# Patient Record
Sex: FEMALE | Race: WHITE | Employment: FULL TIME | URBAN - NONMETROPOLITAN AREA
[De-identification: names, ages, dates, MRNs, and addresses within clinical notes are randomized per-mention and may not be internally consistent; named-entity substitution may affect disease eponyms.]

---

## 2019-09-05 ENCOUNTER — OFFICE VISIT (OUTPATIENT)
Dept: FAMILY MEDICINE CLINIC | Age: 54
End: 2019-09-05
Payer: COMMERCIAL

## 2019-09-05 VITALS
HEART RATE: 70 BPM | DIASTOLIC BLOOD PRESSURE: 74 MMHG | SYSTOLIC BLOOD PRESSURE: 118 MMHG | WEIGHT: 135 LBS | OXYGEN SATURATION: 97 % | BODY MASS INDEX: 24.84 KG/M2 | HEIGHT: 62 IN | TEMPERATURE: 97.6 F

## 2019-09-05 DIAGNOSIS — Z00.00 ROUTINE GENERAL MEDICAL EXAMINATION AT A HEALTH CARE FACILITY: Primary | ICD-10-CM

## 2019-09-05 PROCEDURE — 99396 PREV VISIT EST AGE 40-64: CPT | Performed by: FAMILY MEDICINE

## 2019-09-05 RX ORDER — LEVOTHYROXINE SODIUM 0.1 MG/1
100 TABLET ORAL DAILY
COMMUNITY
Start: 2019-08-20

## 2019-09-05 RX ORDER — ATORVASTATIN CALCIUM 20 MG/1
TABLET, FILM COATED ORAL
COMMUNITY
Start: 2019-08-27 | End: 2021-06-28 | Stop reason: SDUPTHER

## 2019-09-05 RX ORDER — PANTOPRAZOLE SODIUM 40 MG/1
TABLET, DELAYED RELEASE ORAL
COMMUNITY
Start: 2019-08-20 | End: 2020-04-28 | Stop reason: SDUPTHER

## 2019-09-05 ASSESSMENT — PATIENT HEALTH QUESTIONNAIRE - PHQ9
SUM OF ALL RESPONSES TO PHQ9 QUESTIONS 1 & 2: 0
SUM OF ALL RESPONSES TO PHQ QUESTIONS 1-9: 0
1. LITTLE INTEREST OR PLEASURE IN DOING THINGS: 0
2. FEELING DOWN, DEPRESSED OR HOPELESS: 0
SUM OF ALL RESPONSES TO PHQ QUESTIONS 1-9: 0

## 2019-09-05 ASSESSMENT — ENCOUNTER SYMPTOMS
BACK PAIN: 0
ABDOMINAL PAIN: 0
EYE PAIN: 0
SINUS PAIN: 0
COUGH: 0
DIARRHEA: 0
SHORTNESS OF BREATH: 0
CONSTIPATION: 0
SORE THROAT: 0

## 2019-10-15 ENCOUNTER — OFFICE VISIT (OUTPATIENT)
Dept: FAMILY MEDICINE CLINIC | Age: 54
End: 2019-10-15
Payer: COMMERCIAL

## 2019-10-15 VITALS
BODY MASS INDEX: 24.51 KG/M2 | OXYGEN SATURATION: 98 % | DIASTOLIC BLOOD PRESSURE: 72 MMHG | SYSTOLIC BLOOD PRESSURE: 120 MMHG | TEMPERATURE: 98.1 F | WEIGHT: 134 LBS | HEART RATE: 68 BPM

## 2019-10-15 DIAGNOSIS — J06.9 UPPER RESPIRATORY TRACT INFECTION, UNSPECIFIED TYPE: Primary | ICD-10-CM

## 2019-10-15 PROCEDURE — 99214 OFFICE O/P EST MOD 30 MIN: CPT | Performed by: PHYSICIAN ASSISTANT

## 2019-10-15 PROCEDURE — 94640 AIRWAY INHALATION TREATMENT: CPT | Performed by: PHYSICIAN ASSISTANT

## 2019-10-15 RX ORDER — AZITHROMYCIN 250 MG/1
250 TABLET, FILM COATED ORAL SEE ADMIN INSTRUCTIONS
Qty: 6 TABLET | Refills: 0 | Status: SHIPPED | OUTPATIENT
Start: 2019-10-15 | End: 2019-10-20

## 2019-10-15 RX ORDER — IPRATROPIUM BROMIDE AND ALBUTEROL SULFATE 2.5; .5 MG/3ML; MG/3ML
1 SOLUTION RESPIRATORY (INHALATION) ONCE
Status: COMPLETED | OUTPATIENT
Start: 2019-10-15 | End: 2019-10-15

## 2019-10-15 RX ORDER — METHYLPREDNISOLONE 4 MG/1
TABLET ORAL
Qty: 1 KIT | Refills: 0 | Status: SHIPPED | OUTPATIENT
Start: 2019-10-15 | End: 2019-10-21

## 2019-10-15 RX ADMIN — IPRATROPIUM BROMIDE AND ALBUTEROL SULFATE 1 AMPULE: 2.5; .5 SOLUTION RESPIRATORY (INHALATION) at 09:09

## 2019-10-15 ASSESSMENT — ENCOUNTER SYMPTOMS
NAUSEA: 0
BACK PAIN: 0
ABDOMINAL PAIN: 0
SORE THROAT: 0
SHORTNESS OF BREATH: 0
PHOTOPHOBIA: 0
DIARRHEA: 0
VOMITING: 0
COUGH: 1

## 2020-04-06 ENCOUNTER — TELEPHONE (OUTPATIENT)
Dept: PRIMARY CARE CLINIC | Age: 55
End: 2020-04-06

## 2020-04-06 NOTE — TELEPHONE ENCOUNTER
Pt was seen in the ER last Friday. She had extreme dizziness with elevated BP. Pt is having numbness and tingling in all her extremities with still a little bit of dizziness. They put her on Prednisone and Flexeril in the ER. She states she needs an MRI ordered of her neck. Please contact pt with further instructions at work# until 3, then her cell.

## 2020-04-10 ENCOUNTER — OFFICE VISIT (OUTPATIENT)
Dept: PRIMARY CARE CLINIC | Age: 55
End: 2020-04-10
Payer: COMMERCIAL

## 2020-04-10 VITALS
SYSTOLIC BLOOD PRESSURE: 120 MMHG | BODY MASS INDEX: 25.21 KG/M2 | HEART RATE: 106 BPM | RESPIRATION RATE: 15 BRPM | DIASTOLIC BLOOD PRESSURE: 88 MMHG | WEIGHT: 137 LBS | TEMPERATURE: 97.8 F | OXYGEN SATURATION: 99 % | HEIGHT: 62 IN

## 2020-04-10 PROCEDURE — 99214 OFFICE O/P EST MOD 30 MIN: CPT | Performed by: FAMILY MEDICINE

## 2020-04-10 RX ORDER — CYCLOBENZAPRINE HCL 10 MG
TABLET ORAL
COMMUNITY
Start: 2020-04-03 | End: 2020-06-08 | Stop reason: SDUPTHER

## 2020-04-10 RX ORDER — PREDNISONE 20 MG/1
TABLET ORAL
COMMUNITY
Start: 2020-04-03 | End: 2020-04-28

## 2020-04-10 RX ORDER — TRAMADOL HYDROCHLORIDE 50 MG/1
50 TABLET ORAL 2 TIMES DAILY
Qty: 20 TABLET | Refills: 0 | Status: SHIPPED | OUTPATIENT
Start: 2020-04-10 | End: 2020-04-20

## 2020-04-10 ASSESSMENT — ENCOUNTER SYMPTOMS
BLOOD IN STOOL: 0
SHORTNESS OF BREATH: 0
ABDOMINAL PAIN: 0
DIARRHEA: 0
COUGH: 0
NAUSEA: 0
TROUBLE SWALLOWING: 0
VOMITING: 0
EYE DISCHARGE: 0
ALLERGIC/IMMUNOLOGIC NEGATIVE: 1
BACK PAIN: 0
PHOTOPHOBIA: 0
SINUS PAIN: 0
EYE REDNESS: 0
EYE PAIN: 0
CHEST TIGHTNESS: 0
SORE THROAT: 0

## 2020-04-10 ASSESSMENT — PATIENT HEALTH QUESTIONNAIRE - PHQ9
SUM OF ALL RESPONSES TO PHQ9 QUESTIONS 1 & 2: 1
SUM OF ALL RESPONSES TO PHQ QUESTIONS 1-9: 1
1. LITTLE INTEREST OR PLEASURE IN DOING THINGS: 1
SUM OF ALL RESPONSES TO PHQ QUESTIONS 1-9: 1
2. FEELING DOWN, DEPRESSED OR HOPELESS: 0

## 2020-04-20 ENCOUNTER — TELEPHONE (OUTPATIENT)
Dept: FAMILY MEDICINE CLINIC | Age: 55
End: 2020-04-20

## 2020-04-20 NOTE — TELEPHONE ENCOUNTER
Merrick Rocha Coordinator       She is calling to tell you the the MRI was approved for [address] [facility] # [id number]      She provided no phone number

## 2020-04-28 ENCOUNTER — OFFICE VISIT (OUTPATIENT)
Dept: PRIMARY CARE CLINIC | Age: 55
End: 2020-04-28
Payer: COMMERCIAL

## 2020-04-28 ENCOUNTER — TELEPHONE (OUTPATIENT)
Dept: ADMINISTRATIVE | Age: 55
End: 2020-04-28

## 2020-04-28 VITALS
OXYGEN SATURATION: 97 % | HEIGHT: 62 IN | HEART RATE: 58 BPM | TEMPERATURE: 96.4 F | WEIGHT: 131 LBS | RESPIRATION RATE: 16 BRPM | BODY MASS INDEX: 24.11 KG/M2 | SYSTOLIC BLOOD PRESSURE: 120 MMHG | DIASTOLIC BLOOD PRESSURE: 82 MMHG

## 2020-04-28 PROBLEM — M34.1 CREST SYNDROME (HCC): Status: ACTIVE | Noted: 2020-04-28

## 2020-04-28 PROBLEM — Z22.7 LTBI (LATENT TUBERCULOSIS INFECTION): Status: ACTIVE | Noted: 2020-04-28

## 2020-04-28 PROBLEM — E03.8 OTHER SPECIFIED HYPOTHYROIDISM: Status: ACTIVE | Noted: 2020-04-28

## 2020-04-28 PROBLEM — G95.0 SYRINGOMYELIA (HCC): Status: ACTIVE | Noted: 2020-04-28

## 2020-04-28 PROBLEM — K21.9 GASTROESOPHAGEAL REFLUX DISEASE WITHOUT ESOPHAGITIS: Status: ACTIVE | Noted: 2020-04-28

## 2020-04-28 PROCEDURE — 99214 OFFICE O/P EST MOD 30 MIN: CPT | Performed by: FAMILY MEDICINE

## 2020-04-28 RX ORDER — OXYCODONE HYDROCHLORIDE 5 MG/1
5 TABLET ORAL EVERY 4 HOURS PRN
COMMUNITY
End: 2020-06-08 | Stop reason: SDUPTHER

## 2020-04-28 RX ORDER — PANTOPRAZOLE SODIUM 40 MG/1
40 TABLET, DELAYED RELEASE ORAL DAILY
Qty: 30 TABLET | Refills: 5 | Status: SHIPPED
Start: 2020-04-28 | End: 2020-05-18 | Stop reason: SDUPTHER

## 2020-04-28 RX ORDER — IBUPROFEN 200 MG
200 TABLET ORAL EVERY 6 HOURS PRN
COMMUNITY

## 2020-04-28 ASSESSMENT — ENCOUNTER SYMPTOMS
DIARRHEA: 0
BACK PAIN: 1
PHOTOPHOBIA: 0
BLOOD IN STOOL: 0
EYE PAIN: 0
ALLERGIC/IMMUNOLOGIC NEGATIVE: 1
NAUSEA: 0
EYE REDNESS: 0
ABDOMINAL PAIN: 0
VOMITING: 0
CHEST TIGHTNESS: 0
COUGH: 0
SHORTNESS OF BREATH: 0
SINUS PAIN: 0
EYE DISCHARGE: 0
TROUBLE SWALLOWING: 0
SORE THROAT: 0

## 2020-04-28 NOTE — PROGRESS NOTES
partner: Not on file     Emotionally abused: Not on file     Physically abused: Not on file     Forced sexual activity: Not on file   Other Topics Concern    Not on file   Social History Narrative    Not on file       Vitals:    04/28/20 1059   BP: 120/82   Pulse: 58   Resp: 16   Temp: 96.4 °F (35.8 °C)   TempSrc: Temporal   SpO2: 97%   Weight: 131 lb (59.4 kg)   Height: 5' 2\" (1.575 m)       Physical Exam  Vitals signs and nursing note reviewed. Constitutional:       Appearance: She is well-developed. HENT:      Head: Atraumatic. Right Ear: External ear normal.      Left Ear: External ear normal.      Nose: Nose normal.      Mouth/Throat:      Pharynx: No oropharyngeal exudate. Eyes:      Conjunctiva/sclera: Conjunctivae normal.      Pupils: Pupils are equal, round, and reactive to light. Neck:      Musculoskeletal: Normal range of motion and neck supple. Thyroid: No thyromegaly. Trachea: No tracheal deviation. Cardiovascular:      Rate and Rhythm: Normal rate and regular rhythm. Heart sounds: No murmur. No friction rub. No gallop. Pulmonary:      Effort: Pulmonary effort is normal. No respiratory distress. Breath sounds: Normal breath sounds. Abdominal:      General: Bowel sounds are normal.      Palpations: Abdomen is soft. Musculoskeletal: Normal range of motion. General: Tenderness present. Lymphadenopathy:      Cervical: No cervical adenopathy. Skin:     General: Skin is warm and dry. Capillary Refill: Capillary refill takes less than 2 seconds. Findings: No rash. Neurological:      Mental Status: She is alert and oriented to person, place, and time. Sensory: Sensory deficit present. Motor: Weakness present. No abnormal muscle tone.       Coordination: Coordination normal.      Gait: Gait abnormal.      Deep Tendon Reflexes: Reflexes normal.      Comments: Walker   Psychiatric:         Mood and Affect: Mood normal.         Behavior:

## 2020-05-08 LAB
BASOPHILS ABSOLUTE: 0.1 /ΜL
BASOPHILS RELATIVE PERCENT: 0.6 %
BUN BLDV-MCNC: 24 MG/DL
CALCIUM SERPL-MCNC: 8.7 MG/DL
CHLORIDE BLD-SCNC: 107 MMOL/L
CO2: 25 MMOL/L
CREAT SERPL-MCNC: 0.8 MG/DL
EOSINOPHILS ABSOLUTE: 0 /ΜL
EOSINOPHILS RELATIVE PERCENT: 0 %
GFR CALCULATED: 0.6
GLUCOSE BLD-MCNC: 227 MG/DL
HCT VFR BLD CALC: 37.5 % (ref 36–46)
HEMOGLOBIN: 12.9 G/DL (ref 12–16)
LYMPHOCYTES ABSOLUTE: 1.5 /ΜL
LYMPHOCYTES RELATIVE PERCENT: 15 %
MCH RBC QN AUTO: 30.9 PG
MCHC RBC AUTO-ENTMCNC: 34.3 G/DL
MCV RBC AUTO: 90.1 FL
MONOCYTES ABSOLUTE: 0.6 /ΜL
MONOCYTES RELATIVE PERCENT: 5.9 %
NEUTROPHILS ABSOLUTE: 7.7 /ΜL
NEUTROPHILS RELATIVE PERCENT: 78.5 %
PLATELET # BLD: 228 K/ΜL
PMV BLD AUTO: 8 FL
POTASSIUM SERPL-SCNC: 3.6 MMOL/L
RBC # BLD: 4.17 10^6/ΜL
SODIUM BLD-SCNC: 139 MMOL/L
WBC # BLD: 9.9 10^3/ML

## 2020-05-18 ENCOUNTER — OFFICE VISIT (OUTPATIENT)
Dept: PRIMARY CARE CLINIC | Age: 55
End: 2020-05-18
Payer: COMMERCIAL

## 2020-05-18 VITALS
HEART RATE: 60 BPM | BODY MASS INDEX: 23.74 KG/M2 | TEMPERATURE: 96.5 F | SYSTOLIC BLOOD PRESSURE: 110 MMHG | DIASTOLIC BLOOD PRESSURE: 66 MMHG | OXYGEN SATURATION: 98 % | HEIGHT: 62 IN | WEIGHT: 129 LBS

## 2020-05-18 PROBLEM — C31.1: Status: ACTIVE | Noted: 2020-05-18

## 2020-05-18 PROBLEM — E78.5 HYPERLIPIDEMIA: Status: ACTIVE | Noted: 2019-08-13

## 2020-05-18 PROCEDURE — 99214 OFFICE O/P EST MOD 30 MIN: CPT | Performed by: FAMILY MEDICINE

## 2020-05-18 PROCEDURE — 1111F DSCHRG MED/CURRENT MED MERGE: CPT | Performed by: FAMILY MEDICINE

## 2020-05-18 RX ORDER — PANTOPRAZOLE SODIUM 40 MG/1
40 TABLET, DELAYED RELEASE ORAL 2 TIMES DAILY
Qty: 60 TABLET | Refills: 5 | Status: SHIPPED
Start: 2020-05-18 | End: 2021-05-10

## 2020-05-18 RX ORDER — PREDNISONE 20 MG/1
20 TABLET ORAL
COMMUNITY
Start: 2020-05-18 | End: 2020-07-08 | Stop reason: SDUPTHER

## 2020-05-18 ASSESSMENT — ENCOUNTER SYMPTOMS
NAUSEA: 0
ABDOMINAL PAIN: 0
DIARRHEA: 0
VOMITING: 0
COUGH: 0
BACK PAIN: 0
WHEEZING: 0
SHORTNESS OF BREATH: 0
CONSTIPATION: 0

## 2020-05-18 NOTE — PROGRESS NOTES
at time of hospital discharge: Yes    Chief Complaint   Patient presents with    Follow-Up from Hospital     still having a lot of pain -    Referral - General     needs a pre auth letter to sent to insurance to see rheumatology and neurology in [facility]        HPI:  Inpatient course: Discharge summary reviewed- see chart. Interval history/Current status: 49 yo female patient of Dr. Nacho Cruz presents for hospital discharge follow up. She was seen at [facility] on 5/5/20 with complaint of increased pain and parasthesias in multiple parts of her extremities. She had been admitted two weeks prior with similar symptoms. Scans demonstrated syringomyelia (C6-T1) and partial cauda equina syndrome. She has had a thorough work up for symptoms including CTs, MRIs, LP, labs, etc.  She was seen by neurology, but no definitive diagnosis could be made. She was treated with high dose Solumedrol and discharged on pain medications. Neurology noted on her DC paperwork that there is nothing further to be done from a neurologic standpoint other than to treat neuropathic pain. Neuro is deferring work up and treatment to rheumatology at this time. Patient does have a history of CREST, but is not currently taking anything for it. Also has a history of latent TB for which she is being treated with INH. Patient is requesting new referrals to [facility] providers for a second opinion. Vitals:    05/18/20 1504   BP: 110/66   Pulse: 60   Temp: 96.5 °F (35.8 °C)   SpO2: 98%   Weight: 129 lb (58.5 kg)   Height: 5' 2\" (1.575 m)     Body mass index is 23.59 kg/m². Wt Readings from Last 3 Encounters:   05/18/20 129 lb (58.5 kg)   04/28/20 131 lb (59.4 kg)   04/10/20 137 lb (62.1 kg)     BP Readings from Last 3 Encounters:   05/18/20 110/66   04/28/20 120/82   04/10/20 120/88       Review of Systems   Constitutional: Negative for chills, fatigue and fever.    Respiratory: Negative for cough, shortness of breath and wheezing. Cardiovascular: Negative for chest pain and palpitations. Gastrointestinal: Negative for abdominal pain, constipation, diarrhea, nausea and vomiting. Musculoskeletal: Positive for arthralgias, gait problem, myalgias and neck pain. Negative for back pain. Skin: Negative for rash. Neurological: Positive for numbness. Negative for dizziness, tremors, seizures, weakness and headaches. Psychiatric/Behavioral: Negative for dysphoric mood. The patient is not nervous/anxious. All other systems reviewed and are negative. Physical Exam  Vitals signs and nursing note reviewed. Constitutional:       General: She is not in acute distress. Appearance: Normal appearance. She is well-developed. HENT:      Head: Normocephalic and atraumatic. Right Ear: Hearing and external ear normal.      Left Ear: Hearing and external ear normal.      Nose:      Comments: Patient wearing mask  Eyes:      General: Lids are normal. No scleral icterus. Extraocular Movements: Extraocular movements intact. Conjunctiva/sclera: Conjunctivae normal.   Neck:      Musculoskeletal: Normal range of motion and neck supple. Thyroid: No thyromegaly. Cardiovascular:      Rate and Rhythm: Normal rate and regular rhythm. Heart sounds: Normal heart sounds. No murmur. Pulmonary:      Effort: Pulmonary effort is normal. No respiratory distress. Breath sounds: Normal breath sounds. No wheezing. Musculoskeletal: Normal range of motion. General: No tenderness or deformity. Right lower leg: No edema. Left lower leg: No edema. Lymphadenopathy:      Cervical: No cervical adenopathy. Skin:     General: Skin is warm and dry. Findings: No rash. Neurological:      General: No focal deficit present. Mental Status: She is alert and oriented to person, place, and time. Motor: Weakness (mild weakness with hip flexion) present.       Gait: Gait abnormal (ambulates with cane). Psychiatric:         Mood and Affect: Mood and affect normal.         Speech: Speech normal.         Behavior: Behavior normal.         Thought Content: Thought content normal.       Labs:  CBC with Differential:    Lab Results   Component Value Date    WBC 9.9 05/08/2020    RBC 4.17 05/08/2020    HGB 12.9 05/08/2020    HCT 37.5 05/08/2020     05/08/2020    MCV 90.1 05/08/2020    MCH 30.9 05/08/2020    MCHC 34.3 05/08/2020    LYMPHOPCT 15.0 05/08/2020    MONOPCT 5.9 05/08/2020    EOSPCT 0 05/08/2020    BASOPCT 0.60 05/08/2020    MONOSABS 0.6 05/08/2020    LYMPHSABS 1.5 05/08/2020    EOSABS 0.0 05/08/2020    BASOSABS 0.1 05/08/2020     CMP:    Lab Results   Component Value Date     05/08/2020    K 3.6 05/08/2020     05/08/2020    CO2 25.0 05/08/2020    BUN 24.0 05/08/2020    CREATININE 0.80 05/08/2020    LABGLOM 0.60 05/08/2020    GLUCOSE 227 05/08/2020    CALCIUM 8.7 05/08/2020     HgBA1c:  No results found for: LABA1C  FLP:  No results found for: TRIG, HDL, LDLCALC, LDLDIRECT, LABVLDL  TSH:  No results found for: TSH  JENNIFER:  No results found for: ANATITER, JENNIFER  RF:  No results found for: RF  DSDNA:  No components found for: DNA      Assessment/Plan:  1. Syringomyelia [facility])  New referral placed for rheumatology and neurosurgery. Patient needs new evaluations and second opinions at this time. - PA DISCHARGE MEDS RECONCILED W/ CURRENT OUTPATIENT MED LIST  - External Referral To Rheumatology  - External Referral To Neurosurgery    2. CREST syndrome [facility])  Referral to Rheum  - External Referral To Rheumatology    3. Gastroesophageal reflux disease without esophagitis  Needs refills  - pantoprazole (PROTONIX) 40 MG tablet; Take 1 tablet by mouth 2 times daily  Dispense: 60 tablet; Refill: 5        Medical Decision Making: moderate complexity      Return in about 4 weeks (around 6/15/2020) for Recheck.     Seen By:  Rachana Gracia DO

## 2020-05-22 LAB
ALBUMIN SERPL-MCNC: 3.8 G/DL
ALP BLD-CCNC: 63 U/L
ALT SERPL-CCNC: 57 U/L
ANION GAP SERPL CALCULATED.3IONS-SCNC: 7.7 MMOL/L
AST SERPL-CCNC: 27 U/L
BASOPHILS ABSOLUTE: 0 /ΜL
BASOPHILS RELATIVE PERCENT: 0.2 %
BILIRUB SERPL-MCNC: 0.5 MG/DL (ref 0.1–1.4)
BUN BLDV-MCNC: 19 MG/DL
CALCIUM SERPL-MCNC: 9.2 MG/DL
CHLORIDE BLD-SCNC: 104 MMOL/L
CO2: 31 MMOL/L
CREAT SERPL-MCNC: 0.7 MG/DL
EOSINOPHILS ABSOLUTE: 0 /ΜL
EOSINOPHILS RELATIVE PERCENT: 0.1 %
GFR CALCULATED: >60
GLUCOSE BLD-MCNC: 130 MG/DL
HCT VFR BLD CALC: 44.3 % (ref 36–46)
HEMOGLOBIN: 15 G/DL (ref 12–16)
LYMPHOCYTES ABSOLUTE: 0.8 /ΜL
LYMPHOCYTES RELATIVE PERCENT: 8.2 %
MCH RBC QN AUTO: 31.2 PG
MCHC RBC AUTO-ENTMCNC: 33.9 G/DL
MCV RBC AUTO: 92 FL
MONOCYTES ABSOLUTE: 0.2 /ΜL
MONOCYTES RELATIVE PERCENT: 1.5 %
NEUTROPHILS ABSOLUTE: 8.9 /ΜL
NEUTROPHILS RELATIVE PERCENT: 90 %
PLATELET # BLD: 220 K/ΜL
PMV BLD AUTO: 13.8 FL
POTASSIUM SERPL-SCNC: 4.3 MMOL/L
RBC # BLD: 4.82 10^6/ΜL
SODIUM BLD-SCNC: 138 MMOL/L
TOTAL PROTEIN: 7.1
WBC # BLD: 9.9 10^3/ML

## 2020-06-08 RX ORDER — CYCLOBENZAPRINE HCL 10 MG
10 TABLET ORAL 3 TIMES DAILY PRN
Qty: 90 TABLET | Refills: 0 | Status: SHIPPED
Start: 2020-06-08 | End: 2020-10-16 | Stop reason: SDUPTHER

## 2020-06-08 RX ORDER — OXYCODONE HYDROCHLORIDE 5 MG/1
5 TABLET ORAL EVERY 12 HOURS PRN
Qty: 60 TABLET | Refills: 0 | Status: SHIPPED | OUTPATIENT
Start: 2020-06-08 | End: 2020-07-08

## 2020-06-08 NOTE — TELEPHONE ENCOUNTER
Refill request,on the pt's oxycodone 5 mg immediate release there is 2 different directions how would you like it to be sent over

## 2020-07-07 RX ORDER — DULOXETIN HYDROCHLORIDE 30 MG/1
CAPSULE, DELAYED RELEASE ORAL
COMMUNITY
Start: 2020-05-13 | End: 2020-07-08

## 2020-07-07 RX ORDER — GABAPENTIN 100 MG/1
CAPSULE ORAL
COMMUNITY
Start: 2020-05-13 | End: 2020-08-12

## 2020-07-07 RX ORDER — ISONIAZID 300 MG/1
TABLET ORAL
COMMUNITY
Start: 2020-05-15 | End: 2020-07-08

## 2020-07-08 ENCOUNTER — OFFICE VISIT (OUTPATIENT)
Dept: PRIMARY CARE CLINIC | Age: 55
End: 2020-07-08
Payer: COMMERCIAL

## 2020-07-08 VITALS
SYSTOLIC BLOOD PRESSURE: 124 MMHG | HEART RATE: 85 BPM | HEIGHT: 61 IN | OXYGEN SATURATION: 98 % | WEIGHT: 140 LBS | DIASTOLIC BLOOD PRESSURE: 82 MMHG | BODY MASS INDEX: 26.43 KG/M2

## 2020-07-08 PROBLEM — E03.9 HYPOTHYROIDISM: Status: ACTIVE | Noted: 2020-04-28

## 2020-07-08 PROCEDURE — 99214 OFFICE O/P EST MOD 30 MIN: CPT | Performed by: FAMILY MEDICINE

## 2020-07-08 RX ORDER — PREDNISONE 20 MG/1
20 TABLET ORAL DAILY
COMMUNITY
End: 2021-09-02

## 2020-07-08 RX ORDER — PREDNISONE 20 MG/1
20 TABLET ORAL DAILY
Qty: 30 TABLET | Refills: 5 | Status: SHIPPED | OUTPATIENT
Start: 2020-07-08 | End: 2020-08-07

## 2020-07-08 NOTE — PROGRESS NOTES
20  Renetta Villa : 1965 Sex: female  Age: 48 y.o. Chief Complaint   Patient presents with    Other     paperwork for fmla      HPI:  48 y.o. female presents today for 2 month(s) follow up of chronic medical conditions and FMLA paperwork. Patient's chart, medical, surgical and medication history all reviewed. Syringomyelia  History:  She was seen at [facility] on 20 with complaint of increased pain and parasthesias in multiple parts of her extremities. She had been admitted two weeks prior with similar symptoms. Scans demonstrated syringomyelia (C6-T1) and partial cauda equina syndrome. She has had a thorough work up for symptoms including CTs, MRIs, LP, labs, etc.  She was seen by neurology, but no definitive diagnosis could be made. She was treated with high dose Solumedrol and discharged on pain medications.       Neurology noted on her DC paperwork that there is nothing further to be done from a neurologic standpoint other than to treat neuropathic pain. Patient does have a history of CREST, but is not currently taking anything for it. Also has a history of latent TB for which she is being treated with INH. Interim:  Since her last visit, patient has been seen by [facility] Rheumatology and Neurosurgery. Neurosurgery found additional syrinx at T7 which they feel is the cause for the pain at the rib cage. They discussed syringosubarachnoid shunt with her, but are waiting on that for now. Gabapentin was increased. The plan is to repeat MRI. Rheumatology (Dr. Jesica Hammonds) saw the patient in  and felt that based on all the available data, that her symptoms were likely an autoimmune/inflammatory neurologic process. She did recommend some screening tests for underlying CREST, but also referred her to an autoimmune neurologist at [facility] for further workup. Today:  Patient states she feels well overall. Gait improved, but still with numbness in hand and lower legs. Needs [facility] paperwork filled out at this time. ROS:  Review of Systems   Constitutional: Negative for chills, fatigue and fever. Respiratory: Negative for cough, shortness of breath and wheezing. Cardiovascular: Negative for chest pain and palpitations. Gastrointestinal: Negative for abdominal pain, constipation, diarrhea, nausea and vomiting. Musculoskeletal: Positive for arthralgias, gait problem, myalgias and neck pain. Negative for back pain. Skin: Negative for rash. Neurological: Positive for numbness. Negative for dizziness, tremors, seizures, weakness and headaches. Psychiatric/Behavioral: Negative for dysphoric mood. The patient is not nervous/anxious. All other systems reviewed and are negative. Current Outpatient Medications on File Prior to Visit   Medication Sig Dispense Refill    predniSONE (DELTASONE) 20 MG tablet Take 20 mg by mouth daily      gabapentin (NEURONTIN) 100 MG capsule Taking 7 daily      cyclobenzaprine (FLEXERIL) 10 MG tablet Take 1 tablet by mouth 3 times daily as needed for Muscle spasms 90 tablet 0    pantoprazole (PROTONIX) 40 MG tablet Take 1 tablet by mouth 2 times daily 60 tablet 5    ibuprofen (ADVIL;MOTRIN) 200 MG tablet Take 200 mg by mouth every 6 hours as needed for Pain 600mg -800mg  Bid prn pain      atorvastatin (LIPITOR) 20 MG tablet       levothyroxine (SYNTHROID) 100 MCG tablet       aspirin 81 MG tablet Take by mouth       No current facility-administered medications on file prior to visit. Allergies   Allergen Reactions    No Known Allergies        History reviewed. No pertinent past medical history. History reviewed. No pertinent surgical history. History reviewed. No pertinent family history.   Social History     Socioeconomic History    Marital status:      Spouse name: Not on file    Number of children: Not on file    Years of education: Not on file    Highest education level: Not on file   Occupational History    Not on file   Social Needs    Financial resource strain: Not on file    Food insecurity     Worry: Not on file     Inability: Not on file    Transportation needs     Medical: Not on file     Non-medical: Not on file   Tobacco Use    Smoking status: Current Every Day Smoker     Packs/day: 0.50     Years: 35.00     Pack years: 17.50     Types: Cigarettes     Start date: 20     Last attempt to quit: 2018     Years since quittin.8    Smokeless tobacco: Never Used    Tobacco comment: 4 cigarettes a day    Substance and Sexual Activity    Alcohol use: Not on file    Drug use: Not on file    Sexual activity: Not on file   Lifestyle    Physical activity     Days per week: Not on file     Minutes per session: Not on file    Stress: Not on file   Relationships    Social connections     Talks on phone: Not on file     Gets together: Not on file     Attends [employer] service: Not on file     Active member of club or organization: Not on file     Attends meetings of clubs or organizations: Not on file     Relationship status: Not on file    Intimate partner violence     Fear of current or ex partner: Not on file     Emotionally abused: Not on file     Physically abused: Not on file     Forced sexual activity: Not on file   Other Topics Concern    Not on file   Social History Narrative    Not on file       Vitals:    20 1131   BP: 124/82   Pulse: 85   SpO2: 98%   Weight: 140 lb (63.5 kg)   Height: 5' 1\" (1.549 m)       Physical Exam:  Physical Exam  Vitals signs and nursing note reviewed. Constitutional:       General: She is not in acute distress. Appearance: Normal appearance. She is well-developed. HENT:      Head: Normocephalic and atraumatic. Right Ear: Hearing and external ear normal.      Left Ear: Hearing and external ear normal.      Nose:      Comments: Patient wearing mask  Eyes:      General: Lids are normal. No scleral icterus.      Extraocular Movements: Extraocular movements intact. Conjunctiva/sclera: Conjunctivae normal.   Neck:      Musculoskeletal: Normal range of motion and neck supple. Thyroid: No thyromegaly. Cardiovascular:      Rate and Rhythm: Normal rate and regular rhythm. Heart sounds: Normal heart sounds. No murmur. Pulmonary:      Effort: Pulmonary effort is normal. No respiratory distress. Breath sounds: Normal breath sounds. No wheezing. Musculoskeletal: Normal range of motion. General: No tenderness or deformity. Right lower leg: No edema. Left lower leg: No edema. Lymphadenopathy:      Cervical: No cervical adenopathy. Skin:     General: Skin is warm and dry. Findings: No rash. Neurological:      General: No focal deficit present. Mental Status: She is alert and oriented to person, place, and time. Motor: Weakness (mild weakness with hip flexion) present. Gait: Gait abnormal (ambulates with cane). Psychiatric:         Mood and Affect: Mood and affect normal.         Speech: Speech normal.         Behavior: Behavior normal.         Thought Content:  Thought content normal.         Labs:  CBC with Differential:    Lab Results   Component Value Date    WBC 9.9 05/22/2020    RBC 4.82 05/22/2020    HGB 15 05/22/2020    HCT 44.3 05/22/2020     05/22/2020    MCV 92.0 05/22/2020    MCH 31.2 05/22/2020    MCHC 33.9 05/22/2020    LYMPHOPCT 8.2 05/22/2020    MONOPCT 1.5 05/22/2020    EOSPCT 0.1 05/22/2020    BASOPCT 0.20 05/22/2020    MONOSABS 0.2 05/22/2020    LYMPHSABS 0.8 05/22/2020    EOSABS 0.0 05/22/2020    BASOSABS 0.0 05/22/2020     CMP:    Lab Results   Component Value Date     05/22/2020    K 4.3 05/22/2020     05/22/2020    CO2 31.0 05/22/2020    BUN 19.0 05/22/2020    CREATININE 0.70 05/22/2020    LABGLOM >60 05/22/2020    GLUCOSE 130 05/22/2020    LABALBU 3.8 05/22/2020    CALCIUM 9.2 05/22/2020    BILITOT 0.5 05/22/2020    ALKPHOS 63 05/22/2020    AST 27 05/22/2020    ALT 57 05/22/2020     HgBA1c:  No results found for: LABA1C  Microalbumen/Creatinine ratio:  No components found for: RUCREAT  FLP:  No results found for: TRIG, HDL, LDLCALC, LDLDIRECT, LABVLDL  TSH:  No results found for: TSH  JENNIFER:  No results found for: ANATITER, JENNIFER  RF:  No results found for: RF  DSDNA:  No components found for: DNA       Assessment and Plan:  Roel Chisholm was seen today for other. Diagnoses and all orders for this visit:    Syringomyelia ([address]  -     predniSONE (DELTASONE) 20 MG tablet; Take 1 tablet by mouth daily    CREST syndrome [facility])    Patient following with [facility] for management of above. [facility] paperwork filled out and updated. Return in about 6 months (around 1/8/2021) for Recheck.       Seen By:  Merlin Quiñonez DO

## 2020-07-17 ASSESSMENT — ENCOUNTER SYMPTOMS
WHEEZING: 0
DIARRHEA: 0
COUGH: 0
VOMITING: 0
CONSTIPATION: 0
SHORTNESS OF BREATH: 0
BACK PAIN: 0
NAUSEA: 0
ABDOMINAL PAIN: 0

## 2020-08-12 ENCOUNTER — OFFICE VISIT (OUTPATIENT)
Dept: PRIMARY CARE CLINIC | Age: 55
End: 2020-08-12
Payer: COMMERCIAL

## 2020-08-12 VITALS
HEIGHT: 61 IN | BODY MASS INDEX: 27.75 KG/M2 | HEART RATE: 81 BPM | DIASTOLIC BLOOD PRESSURE: 70 MMHG | WEIGHT: 147 LBS | TEMPERATURE: 97.6 F | SYSTOLIC BLOOD PRESSURE: 116 MMHG | OXYGEN SATURATION: 97 %

## 2020-08-12 PROBLEM — G37.3 TRANSVERSE MYELITIS (HCC): Status: ACTIVE | Noted: 2020-08-06

## 2020-08-12 PROBLEM — G89.29 CHRONIC MIDLINE LOW BACK PAIN WITHOUT SCIATICA: Status: ACTIVE | Noted: 2020-08-06

## 2020-08-12 PROBLEM — M54.50 CHRONIC MIDLINE LOW BACK PAIN WITHOUT SCIATICA: Status: ACTIVE | Noted: 2020-08-06

## 2020-08-12 PROBLEM — C84.40 PERIPHERAL T-CELL LYMPHOMA (HCC): Status: ACTIVE | Noted: 2020-08-06

## 2020-08-12 PROCEDURE — 99214 OFFICE O/P EST MOD 30 MIN: CPT | Performed by: FAMILY MEDICINE

## 2020-08-12 RX ORDER — GABAPENTIN 300 MG/1
300 CAPSULE ORAL DAILY
COMMUNITY
Start: 2020-07-20

## 2020-08-12 RX ORDER — TRIAMCINOLONE ACETONIDE 5 MG/G
CREAM TOPICAL
Qty: 15 G | Refills: 1 | Status: SHIPPED
Start: 2020-08-12 | End: 2021-01-15 | Stop reason: ALTCHOICE

## 2020-08-12 ASSESSMENT — ENCOUNTER SYMPTOMS
DIARRHEA: 0
SHORTNESS OF BREATH: 0
BACK PAIN: 0
VOMITING: 0
ABDOMINAL PAIN: 0
CONSTIPATION: 0
WHEEZING: 0
COUGH: 0
NAUSEA: 0

## 2020-08-12 NOTE — PROGRESS NOTES
20  Roxanne Mercedes : 1965 Sex: female  Age: 48 y.o. Chief Complaint   Patient presents with   Breckenridgelluvia Crespo Other     return to work paper work     Rash     stomach and r leg- burning and itching x20 days     HPI:  48 y.o. female presents today for 1 month(s) follow up of chronic medical conditions and FMLA paperwork. Patient's chart, medical, surgical and medication history all reviewed. Syringomyelia  History:  She was seen at [facility] on 20 with complaint of increased pain and parasthesias in multiple parts of her extremities. She had been admitted two weeks prior with similar symptoms. Scans demonstrated syringomyelia (C6-T1) and partial cauda equina syndrome. She has had a thorough work up for symptoms including CTs, MRIs, LP, labs, etc.  She was seen by neurology, but no definitive diagnosis could be made. She was treated with high dose Solumedrol and discharged on pain medications.       Neurology noted on her DC paperwork that there is nothing further to be done from a neurologic standpoint other than to treat neuropathic pain. Patient does have a history of CREST, but is not currently taking anything for it. Also has a history of latent TB for which she is being treated with INH. Patient has been seen by [facility]. Neurosurgery found additional syrinx at T7 which they feel is the cause for the pain at the rib cage. They discussed syringosubarachnoid shunt with her, but are waiting on that for now. Gabapentin was increased. Rheumatology (Dr. Gloria Mdcaniels) saw the patient in  and felt that based on all the available data, that her symptoms were likely an autoimmune/inflammatory neurologic process. She did recommend some screening tests for underlying CREST, but also referred her to an autoimmune neurologist at [facility] for further workup. Today:  Patient states she feels well overall. All of her symptoms have improved significantly.   She is ready to go back to work full time at this time. She provided paperwork to be signed to go back to work. She is also complaining of rash on abdomen. Doesn't know what caused it. Previously pruritic, not now. ROS:  Review of Systems   Constitutional: Negative for chills, fatigue and fever. Respiratory: Negative for cough, shortness of breath and wheezing. Cardiovascular: Negative for chest pain and palpitations. Gastrointestinal: Negative for abdominal pain, constipation, diarrhea, nausea and vomiting. Musculoskeletal: Positive for arthralgias, gait problem, myalgias and neck pain. Negative for back pain. Skin: Positive for rash. Neurological: Positive for numbness. Negative for dizziness, tremors, seizures, weakness and headaches. Psychiatric/Behavioral: Negative for dysphoric mood. The patient is not nervous/anxious. All other systems reviewed and are negative. Current Outpatient Medications on File Prior to Visit   Medication Sig Dispense Refill    gabapentin (NEURONTIN) 300 MG capsule take 1 capsule by mouth three times a day      predniSONE (DELTASONE) 20 MG tablet Take 20 mg by mouth daily      cyclobenzaprine (FLEXERIL) 10 MG tablet Take 1 tablet by mouth 3 times daily as needed for Muscle spasms 90 tablet 0    pantoprazole (PROTONIX) 40 MG tablet Take 1 tablet by mouth 2 times daily 60 tablet 5    ibuprofen (ADVIL;MOTRIN) 200 MG tablet Take 200 mg by mouth every 6 hours as needed for Pain 600mg -800mg  Bid prn pain      atorvastatin (LIPITOR) 20 MG tablet       levothyroxine (SYNTHROID) 100 MCG tablet       aspirin 81 MG tablet Take by mouth       No current facility-administered medications on file prior to visit. Allergies   Allergen Reactions    No Known Allergies        History reviewed. No pertinent past medical history. History reviewed. No pertinent surgical history. History reviewed. No pertinent family history.   Social History     Socioeconomic History    Marital status:      Spouse name: Not on file    Number of children: Not on file    Years of education: Not on file    Highest education level: Not on file   Occupational History    Not on file   Social Needs    Financial resource strain: Not on file    Food insecurity     Worry: Not on file     Inability: Not on file    Transportation needs     Medical: Not on file     Non-medical: Not on file   Tobacco Use    Smoking status: Current Every Day Smoker     Packs/day: 0.50     Years: 35.00     Pack years: 17.50     Types: Cigarettes     Start date: 20     Last attempt to quit: 2018     Years since quittin.9    Smokeless tobacco: Never Used    Tobacco comment: 4 cigarettes a day    Substance and Sexual Activity    Alcohol use: Not on file    Drug use: Not on file    Sexual activity: Not on file   Lifestyle    Physical activity     Days per week: Not on file     Minutes per session: Not on file    Stress: Not on file   Relationships    Social connections     Talks on phone: Not on file     Gets together: Not on file     Attends Church service: Not on file     Active member of club or organization: Not on file     Attends meetings of clubs or organizations: Not on file     Relationship status: Not on file    Intimate partner violence     Fear of current or ex partner: Not on file     Emotionally abused: Not on file     Physically abused: Not on file     Forced sexual activity: Not on file   Other Topics Concern    Not on file   Social History Narrative    Not on file       Vitals:    20 0954   BP: 116/70   Pulse: 81   Temp: 97.6 °F (36.4 °C)   SpO2: 97%   Weight: 147 lb (66.7 kg)   Height: 5' 1\" (1.549 m)       Physical Exam:  Physical Exam  Vitals signs and nursing note reviewed. Constitutional:       General: She is not in acute distress. Appearance: Normal appearance. She is well-developed. HENT:      Head: Normocephalic and atraumatic.       Right Ear: Hearing and external ear normal.      Left Ear: Hearing and external ear normal.      Nose:      Comments: Patient wearing mask  Eyes:      General: Lids are normal. No scleral icterus. Extraocular Movements: Extraocular movements intact. Conjunctiva/sclera: Conjunctivae normal.   Neck:      Musculoskeletal: Normal range of motion and neck supple. Thyroid: No thyromegaly. Cardiovascular:      Rate and Rhythm: Normal rate and regular rhythm. Heart sounds: Normal heart sounds. No murmur. Pulmonary:      Effort: Pulmonary effort is normal. No respiratory distress. Breath sounds: Normal breath sounds. No wheezing. Musculoskeletal: Normal range of motion. General: No tenderness or deformity. Right lower leg: No edema. Left lower leg: No edema. Lymphadenopathy:      Cervical: No cervical adenopathy. Skin:     General: Skin is warm and dry. Findings: Rash (pink macular lesions to abdomen) present. Neurological:      General: No focal deficit present. Mental Status: She is alert and oriented to person, place, and time. Motor: Weakness (mild weakness with hip flexion) present. Gait: Gait normal.   Psychiatric:         Mood and Affect: Mood and affect normal.         Speech: Speech normal.         Behavior: Behavior normal.         Thought Content:  Thought content normal.         Labs:  CBC with Differential:    Lab Results   Component Value Date    WBC 9.9 05/22/2020    RBC 4.82 05/22/2020    HGB 15 05/22/2020    HCT 44.3 05/22/2020     05/22/2020    MCV 92.0 05/22/2020    MCH 31.2 05/22/2020    MCHC 33.9 05/22/2020    LYMPHOPCT 8.2 05/22/2020    MONOPCT 1.5 05/22/2020    EOSPCT 0.1 05/22/2020    BASOPCT 0.20 05/22/2020    MONOSABS 0.2 05/22/2020    LYMPHSABS 0.8 05/22/2020    EOSABS 0.0 05/22/2020    BASOSABS 0.0 05/22/2020     CMP:    Lab Results   Component Value Date     05/22/2020    K 4.3 05/22/2020     05/22/2020    CO2 31.0 05/22/2020    BUN 19.0 05/22/2020    CREATININE 0.70 05/22/2020    LABGLOM >60 05/22/2020    GLUCOSE 130 05/22/2020    LABALBU 3.8 05/22/2020    CALCIUM 9.2 05/22/2020    BILITOT 0.5 05/22/2020    ALKPHOS 63 05/22/2020    AST 27 05/22/2020    ALT 57 05/22/2020     HgBA1c:  No results found for: LABA1C  Microalbumen/Creatinine ratio:  No components found for: RUCREAT  FLP:  No results found for: TRIG, HDL, LDLCALC, LDLDIRECT, LABVLDL  TSH:  No results found for: TSH  JENNIFER:  No results found for: ANATITER, JENNIFER  RF:  No results found for: RF  DSDNA:  No components found for: DNA       Assessment and Plan:  Veena Carr was seen today for other and rash. Diagnoses and all orders for this visit:    Syringomyelia ([address]    Contact dermatitis, unspecified contact dermatitis type, unspecified trigger  -     triamcinolone (ARISTOCORT) 0.5 % cream; Apply topically 3 times daily. Patient following with [facility] for management of above. Return to work paperwork signed for patient and faxed today. Will treat rash with steroid cream.      Return in about 5 months (around 1/12/2021) for Well visit.       Seen By:  Cathy Nicole DO

## 2020-10-16 RX ORDER — CYCLOBENZAPRINE HCL 10 MG
10 TABLET ORAL 3 TIMES DAILY PRN
Qty: 270 TABLET | Refills: 1 | Status: SHIPPED | OUTPATIENT
Start: 2020-10-16 | End: 2021-01-14

## 2020-12-05 LAB
TSH SERPL DL<=0.05 MIU/L-ACNC: 0.22 UIU/ML
VITAMIN D 25-HYDROXY: 29.7
VITAMIN D2, 25 HYDROXY: NORMAL
VITAMIN D3,25 HYDROXY: NORMAL

## 2021-01-15 ENCOUNTER — OFFICE VISIT (OUTPATIENT)
Dept: FAMILY MEDICINE CLINIC | Age: 56
End: 2021-01-15
Payer: COMMERCIAL

## 2021-01-15 VITALS
SYSTOLIC BLOOD PRESSURE: 122 MMHG | HEIGHT: 61 IN | HEART RATE: 75 BPM | TEMPERATURE: 97.7 F | WEIGHT: 158 LBS | OXYGEN SATURATION: 98 % | BODY MASS INDEX: 29.83 KG/M2 | DIASTOLIC BLOOD PRESSURE: 82 MMHG

## 2021-01-15 DIAGNOSIS — E55.9 VITAMIN D INSUFFICIENCY: ICD-10-CM

## 2021-01-15 DIAGNOSIS — R73.01 IMPAIRED FASTING GLUCOSE: ICD-10-CM

## 2021-01-15 DIAGNOSIS — G95.0 SYRINGOMYELIA (HCC): Primary | ICD-10-CM

## 2021-01-15 DIAGNOSIS — E78.2 MIXED HYPERLIPIDEMIA: ICD-10-CM

## 2021-01-15 DIAGNOSIS — E03.9 ACQUIRED HYPOTHYROIDISM: ICD-10-CM

## 2021-01-15 DIAGNOSIS — Z12.31 ENCOUNTER FOR SCREENING MAMMOGRAM FOR MALIGNANT NEOPLASM OF BREAST: ICD-10-CM

## 2021-01-15 PROBLEM — G37.9 DEMYELINATING DISEASE OF CENTRAL NERVOUS SYSTEM (HCC): Status: ACTIVE | Noted: 2020-11-30

## 2021-01-15 PROCEDURE — 99214 OFFICE O/P EST MOD 30 MIN: CPT | Performed by: FAMILY MEDICINE

## 2021-01-15 ASSESSMENT — ENCOUNTER SYMPTOMS
VOMITING: 0
COUGH: 0
SHORTNESS OF BREATH: 0
DIARRHEA: 0
NAUSEA: 0
ABDOMINAL PAIN: 0
CONSTIPATION: 0
WHEEZING: 0
BACK PAIN: 0

## 2021-01-15 ASSESSMENT — PATIENT HEALTH QUESTIONNAIRE - PHQ9
SUM OF ALL RESPONSES TO PHQ QUESTIONS 1-9: 0
1. LITTLE INTEREST OR PLEASURE IN DOING THINGS: 0

## 2021-01-15 NOTE — PROGRESS NOTES
1/15/21  Kendal Boxer : 1965 Sex: female  Age: 50 y.o. Chief Complaint   Patient presents with    Thyroid Problem     HPI:  50 y.o. female presents today for 6 month(s) follow up of chronic medical conditions and FMLA paperwork. Patient's chart, medical, surgical and medication history all reviewed. Syringomyelia  History:  She was seen at [facility] on 20 with complaint of increased pain and parasthesias in multiple parts of her extremities. She had been admitted two weeks prior with similar symptoms. Scans demonstrated syringomyelia (C6-T1) and partial cauda equina syndrome. She has had a thorough work up for symptoms including CTs, MRIs, LP, labs, etc.  She was seen by neurology, but no definitive diagnosis could be made. She was treated with high dose Solumedrol and discharged on pain medications. Today:  Patient has been doing well. Still having some pains in her feet and difficulty with excessive lifting or overhead work while working. 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 Has been taking 300 mg Gabapentin every AM and 600 mg every night. Not the recommended TID because of drowsiness. Hypothyroidism  Patient presents for routine follow up of Hypothyroidism. Current symptoms: none. Patient denies change in energy level, diarrhea, heat / cold intolerance, nervousness, palpitations and weight changes. Symptoms have been well-controlled. No difficulty swallowing or masses felt. Last TSH was 0.219. Patient is currently taking levothyroxine 100 mcg. ROS:  Review of Systems   Constitutional: Negative for chills, fatigue and fever. Respiratory: Negative for cough, shortness of breath and wheezing. Cardiovascular: Negative for chest pain and palpitations. Gastrointestinal: Negative for abdominal pain, constipation, diarrhea, nausea and vomiting.    Musculoskeletal: Positive for arthralgias, gait problem, myalgias and neck pain. Negative for back pain. Skin: Negative for rash. Neurological: Positive for numbness. Negative for dizziness, tremors, seizures, weakness and headaches. Psychiatric/Behavioral: Negative for dysphoric mood. The patient is not nervous/anxious. All other systems reviewed and are negative. Current Outpatient Medications on File Prior to Visit   Medication Sig Dispense Refill    gabapentin (NEURONTIN) 300 MG capsule take 1 capsule by mouth three times a day      predniSONE (DELTASONE) 20 MG tablet Take 20 mg by mouth daily      pantoprazole (PROTONIX) 40 MG tablet Take 1 tablet by mouth 2 times daily 60 tablet 5    ibuprofen (ADVIL;MOTRIN) 200 MG tablet Take 200 mg by mouth every 6 hours as needed for Pain 600mg -800mg  Bid prn pain      atorvastatin (LIPITOR) 20 MG tablet       levothyroxine (SYNTHROID) 100 MCG tablet       aspirin 81 MG tablet Take by mouth       No current facility-administered medications on file prior to visit.         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 Ethan Crain, TOTAL ABDOMINAL      NOSE SURGERY      for squamous cell cancer     Family History   Problem Relation Age of Onset    Lung Cancer Mother     Hypertension Mother     COPD Father      Social History     Socioeconomic History    Marital status:      Spouse name: Not on file    Number of children: Not on file    Years of education: Not on file    Highest education level: Not on file   Occupational History    Not on file   Social Needs    Financial resource strain: Not on file    Food insecurity     Worry: Not on file     Inability: Not on file    Transportation needs     Medical: Not on file     Non-medical: Not on file   Tobacco Use    Smoking status: Current Every Day Smoker     Packs/day: 0.50     Years: 35.00     Pack years: 17.50     Types: Cigarettes     Start date: 20     Last attempt to quit: 2018     Years since quittin.3    Smokeless tobacco: Never Used    Tobacco comment: 4 cigarettes a day    Substance and Sexual Activity    Alcohol use: Not on file    Drug use: Not on file    Sexual activity: Not on file   Lifestyle    Physical activity     Days per week: Not on file     Minutes per session: Not on file    Stress: Not on file   Relationships    Social connections     Talks on phone: Not on file     Gets together: Not on file     Attends Nondenominational service: Not on file     Active member of club or organization: Not on file     Attends meetings of clubs or organizations: Not on file     Relationship status: Not on file    Intimate partner violence     Fear of current or ex partner: Not on file     Emotionally abused: Not on file     Physically abused: Not on file     Forced sexual activity: Not on file   Other Topics Concern    Not on file   Social History Narrative    Not on file       Vitals:    01/15/21 1107   BP: 122/82   Pulse: 75   Temp: 97.7 °F (36.5 °C)   TempSrc: Temporal   SpO2: 98%   Weight: 158 lb (71.7 kg)   Height: 5' 0.5\" (1.537 m)       Physical Exam:  Physical Exam  Vitals signs and nursing note reviewed. Constitutional:       General: She is not in acute distress. Appearance: Normal appearance. She is well-developed. HENT:      Head: Normocephalic and atraumatic.       Right Ear: Hearing and external ear normal.      Left Ear: Hearing and external ear normal.      Nose:      Comments: Patient wearing mask  Eyes:      General: Lids are normal. No scleral icterus. Extraocular Movements: Extraocular movements intact. Conjunctiva/sclera: Conjunctivae normal.   Neck:      Musculoskeletal: Normal range of motion and neck supple. Thyroid: No thyromegaly. Cardiovascular:      Rate and Rhythm: Normal rate and regular rhythm. Heart sounds: Normal heart sounds. No murmur. Pulmonary:      Effort: Pulmonary effort is normal. No respiratory distress. Breath sounds: Normal breath sounds. No wheezing. Musculoskeletal: Normal range of motion. General: No tenderness or deformity. Right lower leg: No edema. Left lower leg: No edema. Lymphadenopathy:      Cervical: No cervical adenopathy. Skin:     General: Skin is warm and dry. Findings: No rash. Neurological:      General: No focal deficit present. Mental Status: She is alert and oriented to person, place, and time. Gait: Gait normal.   Psychiatric:         Mood and Affect: Mood and affect normal.         Speech: Speech normal.         Behavior: Behavior normal.         Thought Content:  Thought content normal.         Labs:  CBC with Differential:    Lab Results   Component Value Date    WBC 9.9 05/22/2020    RBC 4.82 05/22/2020    HGB 15 05/22/2020    HCT 44.3 05/22/2020     05/22/2020    MCV 92.0 05/22/2020    MCH 31.2 05/22/2020    MCHC 33.9 05/22/2020    LYMPHOPCT 8.2 05/22/2020    MONOPCT 1.5 05/22/2020    EOSPCT 0.1 05/22/2020    BASOPCT 0.20 05/22/2020    MONOSABS 0.2 05/22/2020    LYMPHSABS 0.8 05/22/2020    EOSABS 0.0 05/22/2020    BASOSABS 0.0 05/22/2020     CMP:    Lab Results   Component Value Date     05/22/2020    K 4.3 05/22/2020     05/22/2020    CO2 31.0 05/22/2020    BUN 19.0 05/22/2020    CREATININE 0.70 05/22/2020    LABGLOM >60 05/22/2020    GLUCOSE 130 05/22/2020    LABALBU 3.8 05/22/2020    CALCIUM 9.2 05/22/2020    BILITOT 0.5 05/22/2020    ALKPHOS 63 05/22/2020    AST 27 05/22/2020    ALT 57 05/22/2020     HgBA1c:  No results found for: LABA1C  Microalbumen/Creatinine ratio:  No components found for: RUCREAT  FLP:  No results found for: TRIG, HDL, LDLCALC, LDLDIRECT, LABVLDL  TSH:    Lab Results   Component Value Date    TSH 0.219 12/05/2020     JENNIFER:  No results found for: ANATITER, JENNIFER  RF:  No results found for: RF  DSDNA:  No components found for: DNA       Assessment and Plan:  Delmy Yung was seen today for thyroid problem. Diagnoses and all orders for this visit:    Syringomyelia ([address]  Overall stable. Had repeat full spine MRI and demonstrated stability of lesions. No surgeries. Acquired hypothyroidism  -     CBC Auto Differential; Future  -     Comprehensive Metabolic Panel; Future  -     TSH without Reflex; Future  -     Urinalysis; Future  -     T4, Free; Future  Stable. Follows with Endo. Will check labs. Vitamin D insufficiency  -     Vitamin D 25 Hydroxy; Future    Impaired fasting glucose  -     Hemoglobin A1C; Future    Mixed hyperlipidemia  -     Lipid Panel; Future    Encounter for screening mammogram for malignant neoplasm of breast  -     [facility] SHARONDA DIGITAL SCREEN BILATERAL; Future        Return in about 6 months (around 7/15/2021) for Well visit.       Seen By:  Paddy Burger DO

## 2021-01-17 PROBLEM — G37.3 TRANSVERSE MYELITIS (HCC): Status: RESOLVED | Noted: 2020-08-06 | Resolved: 2021-01-17

## 2021-01-17 PROBLEM — C84.40 PERIPHERAL T-CELL LYMPHOMA (HCC): Status: RESOLVED | Noted: 2020-08-06 | Resolved: 2021-01-17

## 2021-01-17 PROBLEM — C31.1: Status: RESOLVED | Noted: 2020-05-18 | Resolved: 2021-01-17

## 2021-02-13 LAB
ALBUMIN SERPL-MCNC: NORMAL G/DL
ALP BLD-CCNC: NORMAL U/L
ALT SERPL-CCNC: NORMAL U/L
ANION GAP SERPL CALCULATED.3IONS-SCNC: NORMAL MMOL/L
AST SERPL-CCNC: NORMAL U/L
AVERAGE GLUCOSE: 114
BASOPHILS ABSOLUTE: NORMAL
BASOPHILS RELATIVE PERCENT: NORMAL
BILIRUB SERPL-MCNC: NORMAL MG/DL
BILIRUBIN, URINE: NORMAL
BLOOD, URINE: NORMAL
BUN BLDV-MCNC: NORMAL MG/DL
CALCIUM SERPL-MCNC: NORMAL MG/DL
CHLORIDE BLD-SCNC: NORMAL MMOL/L
CHOLESTEROL, TOTAL: NORMAL
CHOLESTEROL/HDL RATIO: NORMAL
CLARITY: NORMAL
CO2: NORMAL
COLOR: NORMAL
CREAT SERPL-MCNC: NORMAL MG/DL
EOSINOPHILS ABSOLUTE: NORMAL
EOSINOPHILS RELATIVE PERCENT: NORMAL
GFR CALCULATED: NORMAL
GLUCOSE BLD-MCNC: NORMAL MG/DL
GLUCOSE URINE: NORMAL
HBA1C MFR BLD: 5.6 %
HCT VFR BLD CALC: NORMAL %
HDLC SERPL-MCNC: NORMAL MG/DL
HEMOGLOBIN: NORMAL
KETONES, URINE: NORMAL
LDL CHOLESTEROL CALCULATED: NORMAL
LEUKOCYTE ESTERASE, URINE: NORMAL
LYMPHOCYTES ABSOLUTE: NORMAL
LYMPHOCYTES RELATIVE PERCENT: NORMAL
MCH RBC QN AUTO: NORMAL PG
MCHC RBC AUTO-ENTMCNC: NORMAL G/DL
MCV RBC AUTO: NORMAL FL
MONOCYTES ABSOLUTE: NORMAL
MONOCYTES RELATIVE PERCENT: NORMAL
NEUTROPHILS ABSOLUTE: NORMAL
NEUTROPHILS RELATIVE PERCENT: NORMAL
NITRITE, URINE: NORMAL
NONHDLC SERPL-MCNC: NORMAL MG/DL
PH UA: NORMAL
PLATELET # BLD: NORMAL 10*3/UL
PMV BLD AUTO: NORMAL FL
POTASSIUM SERPL-SCNC: NORMAL MMOL/L
PROTEIN UA: NORMAL
RBC # BLD: NORMAL 10*6/UL
SODIUM BLD-SCNC: NORMAL MMOL/L
SPECIFIC GRAVITY, URINE: NORMAL
TOTAL PROTEIN: NORMAL
TRIGL SERPL-MCNC: NORMAL MG/DL
UROBILINOGEN, URINE: NORMAL
VITAMIN D 25-HYDROXY: NORMAL
VITAMIN D2, 25 HYDROXY: NORMAL
VITAMIN D3,25 HYDROXY: NORMAL
VLDLC SERPL CALC-MCNC: NORMAL MG/DL
WBC # BLD: NORMAL 10*3/UL

## 2021-04-07 DIAGNOSIS — Z12.31 ENCOUNTER FOR SCREENING MAMMOGRAM FOR MALIGNANT NEOPLASM OF BREAST: ICD-10-CM

## 2021-05-09 DIAGNOSIS — K21.9 GASTROESOPHAGEAL REFLUX DISEASE WITHOUT ESOPHAGITIS: ICD-10-CM

## 2021-05-10 RX ORDER — PANTOPRAZOLE SODIUM 40 MG/1
TABLET, DELAYED RELEASE ORAL
Qty: 60 TABLET | Refills: 5 | Status: SHIPPED
Start: 2021-05-10 | End: 2021-05-12 | Stop reason: SDUPTHER

## 2021-05-12 DIAGNOSIS — K21.9 GASTROESOPHAGEAL REFLUX DISEASE WITHOUT ESOPHAGITIS: ICD-10-CM

## 2021-05-12 RX ORDER — PANTOPRAZOLE SODIUM 40 MG/1
40 TABLET, DELAYED RELEASE ORAL DAILY
Qty: 60 TABLET | Refills: 5 | Status: SHIPPED
Start: 2021-05-12 | End: 2022-03-17 | Stop reason: SDUPTHER

## 2021-05-12 NOTE — TELEPHONE ENCOUNTER
Name of Medication(s) Requested:  Pantoprazole 40mg qd    Pharmacy Requested:   [facility]      Medication(s) pended? [x] Yes  [] No    Last Appointment:  1/15/2021    Future appts:  Future Appointments   Date Time Provider Delta Wen   7/16/2021 10:30 AM DO CLAUDIA [facility]        Does patient need call back?   [] Yes  [x] No

## 2021-06-28 RX ORDER — ATORVASTATIN CALCIUM 20 MG/1
20 TABLET, FILM COATED ORAL DAILY
Qty: 90 TABLET | Refills: 1 | Status: SHIPPED
Start: 2021-06-28 | End: 2022-03-17 | Stop reason: SDUPTHER

## 2021-06-28 NOTE — TELEPHONE ENCOUNTER
Last Appointment:  1/15/2021  Future Appointments   Date Time Provider Delta Wen   7/16/2021 10:30 AM DO CLAUDIA [facility]

## 2021-07-21 ENCOUNTER — TELEPHONE (OUTPATIENT)
Dept: PRIMARY CARE CLINIC | Age: 56
End: 2021-07-21

## 2021-07-21 NOTE — TELEPHONE ENCOUNTER
Called pt to r/s her 8/9 appt. Dr Ewelina Lua will be out of the office. Pt states she will return call to r/s.

## 2021-08-24 LAB
ALBUMIN SERPL-MCNC: 4.1 G/DL
ALP BLD-CCNC: 94 U/L
ALT SERPL-CCNC: 42 U/L
ANION GAP SERPL CALCULATED.3IONS-SCNC: 1.2 MMOL/L
AST SERPL-CCNC: 24 U/L
BILIRUB SERPL-MCNC: 0.5 MG/DL (ref 0.1–1.4)
BUN BLDV-MCNC: 16 MG/DL
CALCIUM SERPL-MCNC: 8.7 MG/DL
CHLORIDE BLD-SCNC: 109 MMOL/L
CO2: 28 MMOL/L
CREAT SERPL-MCNC: 0.7 MG/DL
GFR CALCULATED: 60
GLUCOSE BLD-MCNC: 93 MG/DL
POTASSIUM SERPL-SCNC: 4 MMOL/L
SODIUM BLD-SCNC: 140 MMOL/L
TOTAL PROTEIN: 7.4

## 2021-09-02 ENCOUNTER — OFFICE VISIT (OUTPATIENT)
Dept: PRIMARY CARE CLINIC | Age: 56
End: 2021-09-02
Payer: COMMERCIAL

## 2021-09-02 VITALS
TEMPERATURE: 98.1 F | WEIGHT: 143 LBS | BODY MASS INDEX: 27 KG/M2 | HEIGHT: 61 IN | OXYGEN SATURATION: 97 % | DIASTOLIC BLOOD PRESSURE: 72 MMHG | SYSTOLIC BLOOD PRESSURE: 118 MMHG | HEART RATE: 63 BPM

## 2021-09-02 DIAGNOSIS — Z00.01 ENCOUNTER FOR WELL ADULT EXAM WITH ABNORMAL FINDINGS: Primary | ICD-10-CM

## 2021-09-02 DIAGNOSIS — E03.9 ACQUIRED HYPOTHYROIDISM: ICD-10-CM

## 2021-09-02 PROCEDURE — 99396 PREV VISIT EST AGE 40-64: CPT | Performed by: FAMILY MEDICINE

## 2021-09-02 ASSESSMENT — ENCOUNTER SYMPTOMS
WHEEZING: 0
DIARRHEA: 0
NAUSEA: 0
VOMITING: 0
SHORTNESS OF BREATH: 0
ABDOMINAL PAIN: 0
CONSTIPATION: 0
COUGH: 0
BACK PAIN: 0

## 2021-09-02 NOTE — PROGRESS NOTES
21  Gerson Alt : 1965 Sex: female  Age: 50 y.o. Chief Complaint   Patient presents with    Annual Exam     HPI:  50 y.o. female presents today for 6 month(s) follow up of chronic medical conditions. Patient's chart, medical, surgical and medication history all reviewed. Syringomyelia  History:  She was seen at [facility] on 20 with complaint of increased pain and parasthesias in multiple parts of her extremities. She had been admitted two weeks prior with similar symptoms. Scans demonstrated syringomyelia (C6-T1) and partial cauda equina syndrome. She has had a thorough work up for symptoms including CTs, MRIs, LP, labs, etc.  She was seen by neurology, but no definitive diagnosis could be made. She was treated with high dose Solumedrol and discharged on pain medications. Today:  Patient has been doing well. Still having some pains in her feet and difficulty with excessive lifting or overhead work while working. 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 Has been taking 300 mg Gabapentin every AM and 600 mg every night. Not the recommended TID because of drowsiness. Hypothyroidism  Patient presents for routine follow up of Hypothyroidism. Current symptoms: none. Patient denies change in energy level, diarrhea, heat / cold intolerance, nervousness, palpitations and weight changes. Symptoms have been well-controlled. No difficulty swallowing or masses felt. Last TSH was 0.219. Patient is currently taking levothyroxine 100 mcg. ROS:  Review of Systems   Constitutional: Negative for chills, fatigue and fever. Respiratory: Negative for cough, shortness of breath and wheezing. Cardiovascular: Negative for chest pain and palpitations. Gastrointestinal: Negative for abdominal pain, constipation, diarrhea, nausea and vomiting. Musculoskeletal: Positive for arthralgias, gait problem, myalgias and neck pain. Negative for back pain. Skin: Negative for rash. Neurological: Positive for numbness. Negative for dizziness, tremors, seizures, weakness and headaches. Psychiatric/Behavioral: Negative for dysphoric mood. The patient is not nervous/anxious. All other systems reviewed and are negative. Current Outpatient Medications on File Prior to Visit   Medication Sig Dispense Refill    atorvastatin (LIPITOR) 20 MG tablet Take 1 tablet by mouth daily 90 tablet 1    pantoprazole (PROTONIX) 40 MG tablet Take 1 tablet by mouth daily 60 tablet 5    gabapentin (NEURONTIN) 300 MG capsule Take 300 mg by mouth daily.  ibuprofen (ADVIL;MOTRIN) 200 MG tablet Take 200 mg by mouth every 6 hours as needed for Pain 600mg -800mg  Bid prn pain      levothyroxine (SYNTHROID) 100 MCG tablet Take 100 mcg by mouth Daily       aspirin 81 MG tablet Take by mouth       No current facility-administered medications on file prior to visit.        No Known Allergies    Past Medical History:   Diagnosis Date    CREST syndrome ([facility] [address] 4/28/2020    Gastroesophageal reflux disease without esophagitis 4/28/2020    Hypothyroidism 4/28/2020    LTBI (latent tuberculosis infection) 4/28/2020    Malignant neoplasm of ethmoidal sinus (HCC) 5/18/2020    Peripheral T-cell lymphoma ([facility] [address] 8/6/2020    Syringomyelia ([facility] [address] 4/28/2020    Transverse myelitis ([facility] [address] 8/6/2020     Past Surgical History:   Procedure Laterality Date    CYSTOSCOPY  12/2016    Dr. Manuel Albarran, TOTAL ABDOMINAL      NOSE SURGERY      for squamous cell cancer     Family History   Problem Relation Age of Onset    Lung Cancer Mother     Hypertension Mother     COPD Father      Social History     Socioeconomic History    Marital status:      Spouse name: Not on file    Number of children: Not on file    Years of education: Not on file    Highest education level: Not on file   Occupational History    Not on file   Tobacco Use    Smoking status: Current Every Day Smoker     Packs/day: 0.50     Years: 35.00     Pack years: 17.50     Types: Cigarettes     Start date: 20     Last attempt to quit: 2018     Years since quittin.9    Smokeless tobacco: Never Used    Tobacco comment: 4 cigarettes a day    Substance and Sexual Activity    Alcohol use: Not on file    Drug use: Not on file    Sexual activity: Not on file   Other Topics Concern    Not on file   Social History Narrative    Not on file     Social Determinants of Health     Financial Resource Strain:     Difficulty of Paying Living Expenses:    Food Insecurity:     Worried About Running Out of Food in the Last Year:     [address] in the Last Year:    Transportation Needs:     Lack of Transportation (Medical):  Lack of Transportation (Non-Medical):    Physical Activity:     Days of Exercise per Week:     Minutes of Exercise per Session:    Stress:     Feeling of Stress :    Social Connections:     Frequency of Communication with Friends and Family:     Frequency of Social Gatherings with Friends and Family:     Attends [employer] Services:     Active Member of Clubs or Organizations:     Attends Club or Organization Meetings:     Marital Status:    Intimate Partner Violence:     Fear of Current or Ex-Partner:     Emotionally Abused:     Physically Abused:     Sexually Abused:        Vitals:    21 1041   BP: 118/72   Pulse: 63   Temp: 98.1 °F (36.7 °C)   SpO2: 97%   Weight: 143 lb (64.9 kg)   Height: 5' 1\" (1.549 m)       Physical Exam:  Physical Exam  Vitals and nursing note reviewed. Constitutional:       General: She is not in acute distress. Appearance: Normal appearance. She is well-developed. HENT:      Head: Normocephalic and atraumatic. Right Ear: Hearing and external ear normal.      Left Ear: Hearing and external ear normal.      Nose:      Comments: Patient wearing mask  Eyes:      General: Lids are normal. No scleral icterus. Extraocular Movements: Extraocular movements intact. Conjunctiva/sclera: Conjunctivae normal.   Neck:      Thyroid: No thyromegaly. Cardiovascular:      Rate and Rhythm: Normal rate and regular rhythm. Heart sounds: Normal heart sounds. No murmur heard. Pulmonary:      Effort: Pulmonary effort is normal. No respiratory distress. Breath sounds: Normal breath sounds. No wheezing. Musculoskeletal:         General: No tenderness or deformity. Normal range of motion. Cervical back: Normal range of motion and neck supple. Right lower leg: No edema. Left lower leg: No edema. Lymphadenopathy:      Cervical: No cervical adenopathy. Skin:     General: Skin is warm and dry. Findings: No rash. Neurological:      General: No focal deficit present. Mental Status: She is alert and oriented to person, place, and time. Gait: Gait normal.   Psychiatric:         Mood and Affect: Mood and affect normal.         Speech: Speech normal.         Behavior: Behavior normal.         Thought Content:  Thought content normal.         Labs:  CBC with Differential:    Lab Results   Component Value Date    WBC 9.9 05/22/2020    RBC 4.82 05/22/2020    HGB 15 05/22/2020    HCT 44.3 05/22/2020     05/22/2020    MCV 92.0 05/22/2020    MCH 31.2 05/22/2020    MCHC 33.9 05/22/2020    LYMPHOPCT 8.2 05/22/2020    MONOPCT 1.5 05/22/2020    EOSPCT 0.1 05/22/2020    BASOPCT 0.20 05/22/2020    MONOSABS 0.2 05/22/2020    LYMPHSABS 0.8 05/22/2020    EOSABS 0.0 05/22/2020    BASOSABS 0.0 05/22/2020     CMP:    Lab Results   Component Value Date     08/24/2021    K 4.0 08/24/2021     08/24/2021    CO2 28.0 08/24/2021    BUN 16.0 08/24/2021    CREATININE 0.70 08/24/2021    LABGLOM 60.0 08/24/2021    GLUCOSE 93 08/24/2021    LABALBU 4.1 08/24/2021    CALCIUM 8.7 08/24/2021    BILITOT 0.5 08/24/2021    ALKPHOS 94 08/24/2021    AST 24 08/24/2021    ALT 42 08/24/2021     HgBA1c:    Lab Results Component Value Date    LABA1C 5.6 02/13/2021     Microalbumen/Creatinine ratio:  No components found for: RUCREAT  FLP:  No results found for: TRIG, HDL, LDLCALC, LDLDIRECT, LABVLDL  TSH:    Lab Results   Component Value Date    TSH 0.219 12/05/2020     JENNIFER:  No results found for: ANATITER, JENNIFER  RF:  No results found for: RF  DSDNA:  No components found for: DNA       Assessment and Plan:  Shadi Farrar was seen today for thyroid problem. Diagnoses and all orders for this visit:    Syringomyelia ([facility] [address]  Overall stable. Had repeat full spine MRI and demonstrated stability of lesions. No surgeries. Acquired hypothyroidism  -     CBC Auto Differential; Future  -     Comprehensive Metabolic Panel; Future  -     TSH without Reflex; Future  -     Urinalysis; Future  -     T4, Free; Future  Stable. Follows with Endo. Will check labs. Vitamin D insufficiency  -     Vitamin D 25 Hydroxy; Future    Impaired fasting glucose  -     Hemoglobin A1C; Future    Mixed hyperlipidemia  -     Lipid Panel; Future    Encounter for screening mammogram for malignant neoplasm of breast  -     [facility] SHARONDA DIGITAL SCREEN BILATERAL; Future        No follow-ups on file.       Seen By:  Zachary Medina DO

## 2021-09-02 NOTE — PROGRESS NOTES
21  Evin Cueto : 1965 Sex: female  Age: 50 y.o. Chief Complaint   Patient presents with    Annual Exam     HPI:  50 y.o. female presents today for yearly exam.  Patient's chart, medical, surgical and medication history all reviewed. Well Adult Physical  Patient here for a physical exam.  The patient reports problems - continued neuropathy symptoms. Do you take any herbs or supplements that were not prescribed by a doctor? yes   Are you taking calcium supplements? no   Are you taking aspirin daily? yes    Colonoscopy: Last colonoscopy was   Dental visit: Within last 6 mos  Vision check:  No Problems  PAP:  S/p hysterectomy  Mammo: 2021- normal    Last time routine bloodwork was done: 2021    Immunization status: up to date and documented. Smoking status: current smoker    Physical activity:  intermittently    ROS:  Review of Systems   Constitutional: Negative for chills, fatigue and fever. Respiratory: Negative for cough, shortness of breath and wheezing. Cardiovascular: Negative for chest pain and palpitations. Gastrointestinal: Negative for abdominal pain, constipation, diarrhea, nausea and vomiting. Musculoskeletal: Positive for arthralgias, gait problem, myalgias and neck pain. Negative for back pain. Skin: Negative for rash. Neurological: Positive for numbness. Negative for dizziness, tremors, seizures, weakness and headaches. Psychiatric/Behavioral: Negative for dysphoric mood. The patient is not nervous/anxious. All other systems reviewed and are negative. Current Outpatient Medications on File Prior to Visit   Medication Sig Dispense Refill    atorvastatin (LIPITOR) 20 MG tablet Take 1 tablet by mouth daily 90 tablet 1    pantoprazole (PROTONIX) 40 MG tablet Take 1 tablet by mouth daily 60 tablet 5    gabapentin (NEURONTIN) 300 MG capsule Take 300 mg by mouth daily.        ibuprofen (ADVIL;MOTRIN) 200 MG tablet Take 200 mg by mouth every 6 hours as needed for Pain 600mg -800mg  Bid prn pain      levothyroxine (SYNTHROID) 100 MCG tablet Take 100 mcg by mouth Daily       aspirin 81 MG tablet Take by mouth       No current facility-administered medications on file prior to visit.        No Known Allergies    Past Medical History:   Diagnosis Date    CREST syndrome ([facility] [address] 2020    Gastroesophageal reflux disease without esophagitis 2020    Hypothyroidism 2020    LTBI (latent tuberculosis infection) 2020    Malignant neoplasm of ethmoidal sinus (HCC) 2020    Peripheral T-cell lymphoma ([facility] [address] 2020    Syringomyelia ([facility] [address] 2020    Transverse myelitis ([facility] [address] 2020     Past Surgical History:   Procedure Laterality Date    CYSTOSCOPY  2016    Dr. Landon Jackman, TOTAL ABDOMINAL      NOSE SURGERY      for squamous cell cancer     Family History   Problem Relation Age of Onset    Lung Cancer Mother     Hypertension Mother     COPD Father      Social History     Socioeconomic History    Marital status:      Spouse name: Not on file    Number of children: Not on file    Years of education: Not on file    Highest education level: Not on file   Occupational History    Not on file   Tobacco Use    Smoking status: Current Every Day Smoker     Packs/day: 0.50     Years: 35.00     Pack years: 17.50     Types: Cigarettes     Start date: 20     Last attempt to quit: 2018     Years since quittin.9    Smokeless tobacco: Never Used    Tobacco comment: 4 cigarettes a day    Substance and Sexual Activity    Alcohol use: Not on file    Drug use: Not on file    Sexual activity: Not on file   Other Topics Concern    Not on file   Social History Narrative    Not on file     Social Determinants of Health     Financial Resource Strain:     Difficulty of Paying Living Expenses:    Food Insecurity:     Worried About Running Out of Food in the Last Year:     [address] in the Last Year: is warm and dry. Findings: No rash. Neurological:      General: No focal deficit present. Mental Status: She is alert and oriented to person, place, and time. Gait: Gait normal.   Psychiatric:         Mood and Affect: Mood and affect normal.         Speech: Speech normal.         Behavior: Behavior normal.         Thought Content: Thought content normal.         Labs:  CBC with Differential:    Lab Results   Component Value Date    WBC 9.9 05/22/2020    RBC 4.82 05/22/2020    HGB 15 05/22/2020    HCT 44.3 05/22/2020     05/22/2020    MCV 92.0 05/22/2020    MCH 31.2 05/22/2020    MCHC 33.9 05/22/2020    LYMPHOPCT 8.2 05/22/2020    MONOPCT 1.5 05/22/2020    EOSPCT 0.1 05/22/2020    BASOPCT 0.20 05/22/2020    MONOSABS 0.2 05/22/2020    LYMPHSABS 0.8 05/22/2020    EOSABS 0.0 05/22/2020    BASOSABS 0.0 05/22/2020     CMP:    Lab Results   Component Value Date     08/24/2021    K 4.0 08/24/2021     08/24/2021    CO2 28.0 08/24/2021    BUN 16.0 08/24/2021    CREATININE 0.70 08/24/2021    LABGLOM 60.0 08/24/2021    GLUCOSE 93 08/24/2021    LABALBU 4.1 08/24/2021    CALCIUM 8.7 08/24/2021    BILITOT 0.5 08/24/2021    ALKPHOS 94 08/24/2021    AST 24 08/24/2021    ALT 42 08/24/2021     U/A:  No results found for: NITRITE, COLORU, PROTEINU, PHUR, LABCAST, WBCUA, RBCUA, MUCUS, TRICHOMONAS, YEAST, BACTERIA, CLARITYU, SPECGRAV, LEUKOCYTESUR, UROBILINOGEN, BILIRUBINUR, BLOODU, GLUCOSEU, AMORPHOUS  HgBA1c:    Lab Results   Component Value Date    LABA1C 5.6 02/13/2021     FLP:  No results found for: TRIG, HDL, LDLCALC, LDLDIRECT, LABVLDL  TSH:    Lab Results   Component Value Date    TSH 0.219 12/05/2020        Assessment and Plan:  Kailash Lowery was seen today for annual exam.    Diagnoses and all orders for this visit:    Encounter for well adult exam with abnormal findings  Patient doing well overall. Continue to follow with her specialists. UTD on . Wellness form signed today.   Labs from Endo reviewed    Acquired hypothyroidism  TSH mildly increased on recent check. Now on 100 mcg daily      Return in about 1 year (around 9/2/2022), or if symptoms worsen or fail to improve, for Well visit.       Seen By:  Jeri Jarvis, DO

## 2022-02-14 LAB
AVERAGE GLUCOSE: 108
BUN BLDV-MCNC: 16 MG/DL
CALCIUM SERPL-MCNC: 8.9 MG/DL
CHLORIDE BLD-SCNC: 109 MMOL/L
CHOLESTEROL, TOTAL: 166 MG/DL
CHOLESTEROL/HDL RATIO: 3.4
CO2: 27 MMOL/L
CREAT SERPL-MCNC: 0.9 MG/DL
GFR CALCULATED: >60
GLUCOSE BLD-MCNC: 95 MG/DL
HBA1C MFR BLD: 5.4 %
HDLC SERPL-MCNC: 49 MG/DL (ref 35–70)
LDL CHOLESTEROL CALCULATED: 69 MG/DL (ref 0–160)
NONHDLC SERPL-MCNC: NORMAL MG/DL
POTASSIUM SERPL-SCNC: 4.2 MMOL/L
SODIUM BLD-SCNC: 142 MMOL/L
TRIGL SERPL-MCNC: 244 MG/DL
VLDLC SERPL CALC-MCNC: 49 MG/DL

## 2022-03-17 ENCOUNTER — OFFICE VISIT (OUTPATIENT)
Dept: PRIMARY CARE CLINIC | Age: 57
End: 2022-03-17
Payer: COMMERCIAL

## 2022-03-17 VITALS
OXYGEN SATURATION: 97 % | HEART RATE: 64 BPM | SYSTOLIC BLOOD PRESSURE: 118 MMHG | DIASTOLIC BLOOD PRESSURE: 72 MMHG | HEIGHT: 61 IN | BODY MASS INDEX: 27.11 KG/M2 | TEMPERATURE: 97.2 F | WEIGHT: 143.6 LBS

## 2022-03-17 DIAGNOSIS — K21.9 GASTROESOPHAGEAL REFLUX DISEASE WITHOUT ESOPHAGITIS: ICD-10-CM

## 2022-03-17 DIAGNOSIS — R06.02 SHORTNESS OF BREATH: ICD-10-CM

## 2022-03-17 DIAGNOSIS — E78.2 MIXED HYPERLIPIDEMIA: ICD-10-CM

## 2022-03-17 DIAGNOSIS — E03.9 ACQUIRED HYPOTHYROIDISM: Primary | ICD-10-CM

## 2022-03-17 DIAGNOSIS — Z12.31 ENCOUNTER FOR SCREENING MAMMOGRAM FOR MALIGNANT NEOPLASM OF BREAST: ICD-10-CM

## 2022-03-17 DIAGNOSIS — G37.9 DEMYELINATING DISEASE OF CENTRAL NERVOUS SYSTEM (HCC): ICD-10-CM

## 2022-03-17 DIAGNOSIS — M34.1 CREST SYNDROME (HCC): ICD-10-CM

## 2022-03-17 DIAGNOSIS — Z22.7 LTBI (LATENT TUBERCULOSIS INFECTION): ICD-10-CM

## 2022-03-17 PROCEDURE — 99214 OFFICE O/P EST MOD 30 MIN: CPT | Performed by: FAMILY MEDICINE

## 2022-03-17 RX ORDER — PANTOPRAZOLE SODIUM 40 MG/1
40 TABLET, DELAYED RELEASE ORAL DAILY
Qty: 90 TABLET | Refills: 2 | Status: SHIPPED
Start: 2022-03-17 | End: 2022-09-30 | Stop reason: SDUPTHER

## 2022-03-17 RX ORDER — ATORVASTATIN CALCIUM 20 MG/1
20 TABLET, FILM COATED ORAL DAILY
Qty: 90 TABLET | Refills: 2 | Status: SHIPPED | OUTPATIENT
Start: 2022-03-17

## 2022-03-17 ASSESSMENT — ENCOUNTER SYMPTOMS
VOMITING: 0
BACK PAIN: 0
COUGH: 0
DIARRHEA: 0
WHEEZING: 0
NAUSEA: 0
CONSTIPATION: 0
SHORTNESS OF BREATH: 0
ABDOMINAL PAIN: 0

## 2022-03-17 ASSESSMENT — PATIENT HEALTH QUESTIONNAIRE - PHQ9
SUM OF ALL RESPONSES TO PHQ QUESTIONS 1-9: 0
2. FEELING DOWN, DEPRESSED OR HOPELESS: 0
SUM OF ALL RESPONSES TO PHQ QUESTIONS 1-9: 0
SUM OF ALL RESPONSES TO PHQ QUESTIONS 1-9: 0
1. LITTLE INTEREST OR PLEASURE IN DOING THINGS: 0
SUM OF ALL RESPONSES TO PHQ QUESTIONS 1-9: 0
SUM OF ALL RESPONSES TO PHQ9 QUESTIONS 1 & 2: 0

## 2022-03-17 NOTE — PROGRESS NOTES
3/17/22  Roxana Banda : 1965 Sex: female  Age: 49 y.o. Chief Complaint   Patient presents with    Orders     pt wants mammogram order and chest xray    Hyperlipidemia    Gastroesophageal Reflux     HPI:  49 y.o. female presents today for 6 month(s) follow up of chronic medical conditions and FMLA paperwork. Patient's chart, medical, surgical and medication history all reviewed. Syringomyelia  History:  She was seen at [facility] on 20 with complaint of increased pain and parasthesias in multiple parts of her extremities. She had been admitted two weeks prior with similar symptoms. Scans demonstrated syringomyelia (C6-T1) and partial cauda equina syndrome. She has had a thorough work up for symptoms including CTs, MRIs, LP, labs, etc.  She was seen by neurology, but no definitive diagnosis could be made. She was treated with high dose Solumedrol and discharged on pain medications. Today:  Patient has been doing well. Still having some pains in her feet and difficulty with excessive lifting or overhead work while working. 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 Has been taking 300 mg Gabapentin every AM and 600 mg every night. Hypothyroidism  Patient presents for routine follow up of Hypothyroidism. Current symptoms: none. Patient denies change in energy level, diarrhea, heat / cold intolerance, nervousness, palpitations and weight changes. Symptoms have been well-controlled. No difficulty swallowing or masses felt. Last TSH was 3.320. Patient is currently taking levothyroxine 100 mcg. ROS:  Review of Systems   Constitutional: Negative for chills, fatigue and fever. Respiratory: Negative for cough, shortness of breath and wheezing. Cardiovascular: Negative for chest pain and palpitations. Gastrointestinal: Negative for abdominal pain, constipation, diarrhea, nausea and vomiting.    Musculoskeletal: Positive for arthralgias, gait problem, myalgias and neck pain. Negative for back pain. Skin: Negative for rash. Neurological: Positive for numbness. Negative for dizziness, tremors, seizures, weakness and headaches. Psychiatric/Behavioral: Negative for dysphoric mood. The patient is not nervous/anxious. All other systems reviewed and are negative. Current Outpatient Medications on File Prior to Visit   Medication Sig Dispense Refill    gabapentin (NEURONTIN) 300 MG capsule Take 300 mg by mouth daily.  ibuprofen (ADVIL;MOTRIN) 200 MG tablet Take 200 mg by mouth every 6 hours as needed for Pain 600mg -800mg  Bid prn pain      levothyroxine (SYNTHROID) 100 MCG tablet Take 100 mcg by mouth Daily       aspirin 81 MG tablet Take by mouth       No current facility-administered medications on file prior to visit.        No Known Allergies    Past Medical History:   Diagnosis Date    CREST syndrome ([address] 4/28/2020    Gastroesophageal reflux disease without esophagitis 4/28/2020    Hypothyroidism 4/28/2020    LTBI (latent tuberculosis infection) 4/28/2020    Malignant neoplasm of ethmoidal sinus (HCC) 5/18/2020    Peripheral T-cell lymphoma ([address] 8/6/2020    Syringomyelia ([address] 4/28/2020    Transverse myelitis ([address] 8/6/2020     Past Surgical History:   Procedure Laterality Date    CYSTOSCOPY  12/2016    Dr. Trip Obando, TOTAL ABDOMINAL      NOSE SURGERY      for squamous cell cancer     Family History   Problem Relation Age of Onset    Lung Cancer Mother     Hypertension Mother     COPD Father      Social History     Socioeconomic History    Marital status:      Spouse name: Not on file    Number of children: Not on file    Years of education: Not on file    Highest education level: Not on file   Occupational History    Not on file   Tobacco Use    Smoking status: Current Every Day Smoker     Packs/day: 0.50     Years: 35.00     Pack years: 17.50     Types: Cigarettes Start date: 18    Smokeless tobacco: Never Used    Tobacco comment: 4 cigarettes a day    Substance and Sexual Activity    Alcohol use: Not on file    Drug use: Not on file    Sexual activity: Not on file   Other Topics Concern    Not on file   Social History Narrative    Not on file     Social Determinants of Health     Financial Resource Strain:     Difficulty of Paying Living Expenses: Not on file   Food Insecurity:     Worried About Running Out of Food in the Last Year: Not on file    Marivel of Food in the Last Year: Not on file   Transportation Needs:     Lack of Transportation (Medical): Not on file    Lack of Transportation (Non-Medical): Not on file   Physical Activity:     Days of Exercise per Week: Not on file    Minutes of Exercise per Session: Not on file   Stress:     Feeling of Stress : Not on file   Social Connections:     Frequency of Communication with Friends and Family: Not on file    Frequency of Social Gatherings with Friends and Family: Not on file    Attends Caodaism Services: Not on file    Active Member of [address] [place] [address] or Organizations: Not on file    Attends Club or Organization Meetings: Not on file    Marital Status: Not on file   Intimate Partner Violence:     Fear of Current or Ex-Partner: Not on file    Emotionally Abused: Not on file    Physically Abused: Not on file    Sexually Abused: Not on file   Housing Stability:     Unable to Pay for Housing in the Last Year: Not on file    Number of [place] in the Last Year: Not on file    Unstable Housing in the Last Year: Not on file       Vitals:    03/17/22 0854   BP: 118/72   Pulse: 64   Temp: 97.2 °F (36.2 °C)   SpO2: 97%   Weight: 143 lb 9.6 oz (65.1 kg)   Height: 5' 1\" (1.549 m)       Physical Exam:  Physical Exam  Vitals and nursing note reviewed. Constitutional:       General: She is not in acute distress. Appearance: Normal appearance. She is well-developed.    HENT:      Head: Normocephalic and atraumatic. Right Ear: Hearing and external ear normal.      Left Ear: Hearing and external ear normal.      Nose:      Comments: Patient wearing mask  Eyes:      General: Lids are normal. No scleral icterus. Extraocular Movements: Extraocular movements intact. Conjunctiva/sclera: Conjunctivae normal.   Neck:      Thyroid: No thyromegaly. Cardiovascular:      Rate and Rhythm: Normal rate and regular rhythm. Heart sounds: Normal heart sounds. No murmur heard. Pulmonary:      Effort: Pulmonary effort is normal. No respiratory distress. Breath sounds: Normal breath sounds. No wheezing. Musculoskeletal:         General: No tenderness or deformity. Normal range of motion. Cervical back: Normal range of motion and neck supple. Right lower leg: No edema. Left lower leg: No edema. Lymphadenopathy:      Cervical: No cervical adenopathy. Skin:     General: Skin is warm and dry. Findings: No rash. Neurological:      General: No focal deficit present. Mental Status: She is alert and oriented to person, place, and time. Gait: Gait normal.   Psychiatric:         Mood and Affect: Mood and affect normal.         Speech: Speech normal.         Behavior: Behavior normal.         Thought Content:  Thought content normal.         Labs:  CBC with Differential:    Lab Results   Component Value Date    WBC 9.9 05/22/2020    RBC 4.82 05/22/2020    HGB 15 05/22/2020    HCT 44.3 05/22/2020     05/22/2020    MCV 92.0 05/22/2020    MCH 31.2 05/22/2020    MCHC 33.9 05/22/2020    LYMPHOPCT 8.2 05/22/2020    MONOPCT 1.5 05/22/2020    EOSPCT 0.1 05/22/2020    BASOPCT 0.20 05/22/2020    MONOSABS 0.2 05/22/2020    LYMPHSABS 0.8 05/22/2020    EOSABS 0.0 05/22/2020    BASOSABS 0.0 05/22/2020     CMP:    Lab Results   Component Value Date     02/14/2022    K 4.2 02/14/2022     02/14/2022    CO2 27 02/14/2022    BUN 16.0 02/14/2022    CREATININE 0.90 02/14/2022 LABGLOM >60 02/14/2022    GLUCOSE 95 02/14/2022    LABALBU 4.1 08/24/2021    CALCIUM 8.9 02/14/2022    BILITOT 0.5 08/24/2021    ALKPHOS 94 08/24/2021    AST 24 08/24/2021    ALT 42 08/24/2021     HgBA1c:    Lab Results   Component Value Date    LABA1C 5.4 02/14/2022     Microalbumen/Creatinine ratio:  No components found for: RUCREAT  FLP:    Lab Results   Component Value Date    TRIG 244 02/14/2022    HDL 49 02/14/2022    LDLCALC 69 02/14/2022     TSH:    Lab Results   Component Value Date    TSH 0.219 12/05/2020     JENNIFER:  No results found for: ANATITER, JENNIFER  RF:  No results found for: RF  DSDNA:  No components found for: DNA       Assessment and Plan:  [facility] was seen today for orders, hyperlipidemia and gastroesophageal reflux. Diagnoses and all orders for this visit:    Acquired hypothyroidism  Well controlled. Follows with Endo. Mixed hyperlipidemia  -     atorvastatin (LIPITOR) 20 MG tablet; Take 1 tablet by mouth daily  Stable. Discussed labs in detail. Gastroesophageal reflux disease without esophagitis  -     pantoprazole (PROTONIX) 40 MG tablet; Take 1 tablet by mouth daily    Shortness of breath  -     XR CHEST (2 VW); Future  Increasing issues with SOB. Wonders if it might be the masks at work. She does have a history of latent TB and does smoke. Had CT chest in 2020 that demonstrated nodules at that time. CREST syndrome (HCC)    Demyelinating disease of central nervous system (HCC)  Stable    LTBI (latent tuberculosis infection)    Encounter for screening mammogram for malignant neoplasm of breast  -     BRIGITTE DIGITAL SCREEN W OR WO CAD BILATERAL; Future          Return in about 1 year (around 3/17/2023), or if symptoms worsen or fail to improve, for Well visit.       Seen By:  Pita Maldonado DO

## 2022-05-02 DIAGNOSIS — R06.02 SHORTNESS OF BREATH: ICD-10-CM

## 2022-05-05 LAB — MAMMOGRAPHY, EXTERNAL: NORMAL

## 2022-05-06 DIAGNOSIS — Z12.31 ENCOUNTER FOR SCREENING MAMMOGRAM FOR MALIGNANT NEOPLASM OF BREAST: ICD-10-CM

## 2022-09-30 DIAGNOSIS — K21.9 GASTROESOPHAGEAL REFLUX DISEASE WITHOUT ESOPHAGITIS: ICD-10-CM

## 2022-09-30 RX ORDER — METHYLPREDNISOLONE 4 MG/1
TABLET ORAL
Qty: 1 KIT | Refills: 0 | Status: SHIPPED | OUTPATIENT
Start: 2022-09-30 | End: 2022-10-06

## 2022-09-30 RX ORDER — PANTOPRAZOLE SODIUM 40 MG/1
40 TABLET, DELAYED RELEASE ORAL DAILY
Qty: 90 TABLET | Refills: 3 | Status: SHIPPED | OUTPATIENT
Start: 2022-09-30

## 2022-09-30 NOTE — TELEPHONE ENCOUNTER
Corey Marroquin called to request a refill on Pantoprazole 40mg. This will need sent to [id number]. She is also requesting a steroid pack be sent to [facility] in [facility]. Yannick Hernandez that she is having a flare up. Last time that this was prescribed was in 2021 but neuro. She is no longer following w/ neurology since you are managing her medications.

## 2023-02-14 LAB
ALBUMIN SERPL-MCNC: 4 G/DL
ALP BLD-CCNC: 88 U/L
ALT SERPL-CCNC: 51 U/L
ANION GAP SERPL CALCULATED.3IONS-SCNC: 9.7 MMOL/L
AST SERPL-CCNC: 25 U/L
AVERAGE GLUCOSE: NORMAL
BILIRUB SERPL-MCNC: 0.4 MG/DL (ref 0.1–1.4)
BUN BLDV-MCNC: 19 MG/DL
CALCIUM SERPL-MCNC: 9.2 MG/DL
CHLORIDE BLD-SCNC: 109 MMOL/L
CHOLESTEROL, TOTAL: 152 MG/DL
CHOLESTEROL/HDL RATIO: 3
CO2: 24 MMOL/L
CREAT SERPL-MCNC: 0.9 MG/DL
EGFR: NORMAL
GLUCOSE BLD-MCNC: 108 MG/DL
HBA1C MFR BLD: 5.6 %
HDLC SERPL-MCNC: 51 MG/DL (ref 35–70)
LDL CHOLESTEROL CALCULATED: 73 MG/DL (ref 0–160)
NONHDLC SERPL-MCNC: NORMAL MG/DL
POTASSIUM SERPL-SCNC: 4.1 MMOL/L
SODIUM BLD-SCNC: 139 MMOL/L
TOTAL PROTEIN: 7.3
TRIGL SERPL-MCNC: 142 MG/DL
VLDLC SERPL CALC-MCNC: 73 MG/DL

## 2023-03-28 ENCOUNTER — OFFICE VISIT (OUTPATIENT)
Dept: PRIMARY CARE CLINIC | Age: 58
End: 2023-03-28
Payer: COMMERCIAL

## 2023-03-28 VITALS
HEART RATE: 60 BPM | WEIGHT: 150.5 LBS | HEIGHT: 61 IN | TEMPERATURE: 98.1 F | DIASTOLIC BLOOD PRESSURE: 66 MMHG | SYSTOLIC BLOOD PRESSURE: 112 MMHG | BODY MASS INDEX: 28.42 KG/M2 | OXYGEN SATURATION: 97 %

## 2023-03-28 DIAGNOSIS — M54.50 CHRONIC MIDLINE LOW BACK PAIN WITHOUT SCIATICA: ICD-10-CM

## 2023-03-28 DIAGNOSIS — Z00.01 ENCOUNTER FOR WELL ADULT EXAM WITH ABNORMAL FINDINGS: Primary | ICD-10-CM

## 2023-03-28 DIAGNOSIS — Z12.31 ENCOUNTER FOR SCREENING MAMMOGRAM FOR MALIGNANT NEOPLASM OF BREAST: ICD-10-CM

## 2023-03-28 DIAGNOSIS — G89.29 CHRONIC MIDLINE LOW BACK PAIN WITHOUT SCIATICA: ICD-10-CM

## 2023-03-28 PROBLEM — G60.9 IDIOPATHIC PERIPHERAL NEUROPATHY: Status: ACTIVE | Noted: 2020-06-25

## 2023-03-28 PROBLEM — G52.7: Status: ACTIVE | Noted: 2021-04-09

## 2023-03-28 PROBLEM — G83.4 CAUDA EQUINA SYNDROME (HCC): Status: ACTIVE | Noted: 2020-05-13

## 2023-03-28 PROBLEM — I25.10 ATHEROSCLEROSIS OF CORONARY ARTERY: Status: ACTIVE | Noted: 2023-03-28

## 2023-03-28 PROCEDURE — 99396 PREV VISIT EST AGE 40-64: CPT | Performed by: FAMILY MEDICINE

## 2023-03-28 RX ORDER — PREDNISONE 10 MG/1
TABLET ORAL
Qty: 30 TABLET | Refills: 0 | Status: SHIPPED | OUTPATIENT
Start: 2023-03-28 | End: 2023-04-09

## 2023-03-28 RX ORDER — CYCLOBENZAPRINE HCL 10 MG
10 TABLET ORAL DAILY
COMMUNITY

## 2023-03-28 RX ORDER — FAMOTIDINE 40 MG/1
TABLET, FILM COATED ORAL
COMMUNITY
Start: 2023-01-16

## 2023-03-28 RX ORDER — ERGOCALCIFEROL 1.25 MG/1
CAPSULE ORAL
COMMUNITY
Start: 2018-10-04

## 2023-03-28 ASSESSMENT — ENCOUNTER SYMPTOMS
WHEEZING: 0
DIARRHEA: 0
SHORTNESS OF BREATH: 0
BACK PAIN: 0
COUGH: 0
CONSTIPATION: 0
VOMITING: 0
NAUSEA: 0
ABDOMINAL PAIN: 0

## 2023-03-28 ASSESSMENT — PATIENT HEALTH QUESTIONNAIRE - PHQ9
SUM OF ALL RESPONSES TO PHQ QUESTIONS 1-9: 0
SUM OF ALL RESPONSES TO PHQ QUESTIONS 1-9: 0
2. FEELING DOWN, DEPRESSED OR HOPELESS: 0
SUM OF ALL RESPONSES TO PHQ QUESTIONS 1-9: 0
SUM OF ALL RESPONSES TO PHQ9 QUESTIONS 1 & 2: 0
1. LITTLE INTEREST OR PLEASURE IN DOING THINGS: 0
SUM OF ALL RESPONSES TO PHQ QUESTIONS 1-9: 0

## 2023-03-28 NOTE — PROGRESS NOTES
Component Value Date/Time    TRIG 142 02/14/2023 12:00 AM    HDL 51 02/14/2023 12:00 AM    LDLCALC 73 02/14/2023 12:00 AM     TSH:    Lab Results   Component Value Date/Time    TSH 0.219 12/05/2020 12:00 AM        Assessment and Plan:  Ariella Perera was seen today for annual exam and other. Diagnoses and all orders for this visit:    Encounter for well adult exam with abnormal findings  Patient stable overall. Follows with Endo, Cardio and NS for medical issues. Labs reviewed in detail. Will request records from cardio. Form filled out and signed. Chronic midline low back pain without sciatica  -     predniSONE (DELTASONE) 10 MG tablet; Take 4 tablets by mouth daily for 3 days, THEN 3 tablets daily for 3 days, THEN 2 tablets daily for 3 days, THEN 1 tablet daily for 3 days. Encounter for screening mammogram for malignant neoplasm of breast  -     BRIGITTE DIGITAL SCREEN W OR WO CAD BILATERAL; Future        Return in about 1 year (around 3/28/2024), or if symptoms worsen or fail to improve, for Well visit.       Seen By:  Polo Saleh,

## 2023-07-05 ENCOUNTER — TELEPHONE (OUTPATIENT)
Dept: PRIMARY CARE CLINIC | Age: 58
End: 2023-07-05

## 2023-07-05 DIAGNOSIS — U07.1 COVID-19: Primary | ICD-10-CM

## 2023-07-05 NOTE — TELEPHONE ENCOUNTER
On Monday the pt started feeling fatigue, got a fever, and has aches so she tested herself for covid yesterday and she is positive she is asking if she can get a script for paxlovid

## 2023-09-23 LAB
ALBUMIN SERPL-MCNC: 3.9 G/DL
ALP BLD-CCNC: 100 U/L
ALT SERPL-CCNC: 65 U/L
ANION GAP SERPL CALCULATED.3IONS-SCNC: 11.9 MMOL/L
AST SERPL-CCNC: 32 U/L
BILIRUB SERPL-MCNC: 0.6 MG/DL (ref 0.1–1.4)
BUN BLDV-MCNC: 16 MG/DL
CALCIUM SERPL-MCNC: 8.9 MG/DL
CHLORIDE BLD-SCNC: 108 MMOL/L
CHOLESTEROL, TOTAL: 146 MG/DL
CHOLESTEROL/HDL RATIO: 3
CO2: 24 MMOL/L
CREAT SERPL-MCNC: 0.9 MG/DL
EGFR: NORMAL
GLUCOSE BLD-MCNC: 103 MG/DL
HDLC SERPL-MCNC: 49 MG/DL (ref 35–70)
LDL CHOLESTEROL CALCULATED: 46 MG/DL (ref 0–160)
NONHDLC SERPL-MCNC: NORMAL MG/DL
POTASSIUM SERPL-SCNC: 3.9 MMOL/L
SODIUM BLD-SCNC: 140 MMOL/L
T4 FREE: 1.2
TOTAL PROTEIN: 7.4
TRIGL SERPL-MCNC: 252 MG/DL
TSH SERPL DL<=0.05 MIU/L-ACNC: 4.07 UIU/ML
VLDLC SERPL CALC-MCNC: 50 MG/DL

## 2023-11-13 DIAGNOSIS — K21.9 GASTROESOPHAGEAL REFLUX DISEASE WITHOUT ESOPHAGITIS: ICD-10-CM

## 2023-11-13 RX ORDER — PANTOPRAZOLE SODIUM 40 MG/1
40 TABLET, DELAYED RELEASE ORAL DAILY
Qty: 90 TABLET | Refills: 3 | Status: SHIPPED | OUTPATIENT
Start: 2023-11-13

## 2024-02-26 ENCOUNTER — TELEPHONE (OUTPATIENT)
Dept: PRIMARY CARE CLINIC | Age: 59
End: 2024-02-26

## 2024-02-26 RX ORDER — PREDNISONE 10 MG/1
TABLET ORAL
Qty: 30 TABLET | Refills: 0 | Status: SHIPPED | OUTPATIENT
Start: 2024-02-26 | End: 2024-03-08

## 2024-02-26 NOTE — TELEPHONE ENCOUNTER
Patient calling. Requesting steroid pack for flare up on spinal cord. Asking if you can send this over for her.     [facility]

## 2024-03-12 DIAGNOSIS — E78.2 MIXED HYPERLIPIDEMIA: ICD-10-CM

## 2024-03-12 RX ORDER — ATORVASTATIN CALCIUM 20 MG/1
20 TABLET, FILM COATED ORAL DAILY
Qty: 90 TABLET | Refills: 3 | Status: SHIPPED | OUTPATIENT
Start: 2024-03-12

## 2024-04-04 LAB
BUN BLDV-MCNC: 15 MG/DL
CALCIUM SERPL-MCNC: 9.5 MG/DL
CHLORIDE BLD-SCNC: 111 MMOL/L
CHOLESTEROL, TOTAL: 111 MG/DL
CHOLESTEROL/HDL RATIO: 3.4
CO2: 25 MMOL/L
CREAT SERPL-MCNC: 0.9 MG/DL
EGFR: NORMAL
ESTIMATED AVERAGE GLUCOSE: 114
GLUCOSE BLD-MCNC: 114 MG/DL
HBA1C MFR BLD: 5.6 %
HDLC SERPL-MCNC: 44 MG/DL (ref 35–70)
LDL CHOLESTEROL CALCULATED: 70 MG/DL (ref 0–160)
NONHDLC SERPL-MCNC: 150 MG/DL
POTASSIUM SERPL-SCNC: 4.1 MMOL/L
SODIUM BLD-SCNC: 141 MMOL/L
TRIGL SERPL-MCNC: 177 MG/DL
VLDLC SERPL CALC-MCNC: 35 MG/DL

## 2024-04-15 ENCOUNTER — OFFICE VISIT (OUTPATIENT)
Dept: PRIMARY CARE CLINIC | Age: 59
End: 2024-04-15
Payer: COMMERCIAL

## 2024-04-15 VITALS
TEMPERATURE: 97.8 F | WEIGHT: 153.38 LBS | DIASTOLIC BLOOD PRESSURE: 70 MMHG | SYSTOLIC BLOOD PRESSURE: 112 MMHG | OXYGEN SATURATION: 98 % | HEART RATE: 73 BPM | BODY MASS INDEX: 28.96 KG/M2 | HEIGHT: 61 IN

## 2024-04-15 DIAGNOSIS — Z00.00 ENCOUNTER FOR WELL ADULT EXAM WITHOUT ABNORMAL FINDINGS: Primary | ICD-10-CM

## 2024-04-15 DIAGNOSIS — G37.9 DEMYELINATING DISEASE OF CENTRAL NERVOUS SYSTEM (HCC): ICD-10-CM

## 2024-04-15 DIAGNOSIS — G83.4 CAUDA EQUINA SYNDROME (HCC): ICD-10-CM

## 2024-04-15 DIAGNOSIS — Z12.31 ENCOUNTER FOR SCREENING MAMMOGRAM FOR MALIGNANT NEOPLASM OF BREAST: ICD-10-CM

## 2024-04-15 DIAGNOSIS — G95.0 SYRINGOMYELIA (HCC): ICD-10-CM

## 2024-04-15 DIAGNOSIS — R91.8 MULTIPLE PULMONARY NODULES: ICD-10-CM

## 2024-04-15 DIAGNOSIS — M34.1 CREST SYNDROME (HCC): ICD-10-CM

## 2024-04-15 DIAGNOSIS — M79.645 PAIN OF LEFT THUMB: ICD-10-CM

## 2024-04-15 DIAGNOSIS — E03.9 ACQUIRED HYPOTHYROIDISM: ICD-10-CM

## 2024-04-15 PROBLEM — C44.91 BASAL CELL CARCINOMA OF SKIN: Status: RESOLVED | Noted: 2019-08-13 | Resolved: 2024-04-15

## 2024-04-15 PROBLEM — C44.91 BASAL CELL CARCINOMA OF SKIN: Status: ACTIVE | Noted: 2019-08-13

## 2024-04-15 PROBLEM — Z22.7 LTBI (LATENT TUBERCULOSIS INFECTION): Status: RESOLVED | Noted: 2020-04-28 | Resolved: 2024-04-15

## 2024-04-15 PROCEDURE — 99396 PREV VISIT EST AGE 40-64: CPT | Performed by: FAMILY MEDICINE

## 2024-04-15 RX ORDER — LEVOTHYROXINE SODIUM 112 UG/1
112 TABLET ORAL DAILY
COMMUNITY
Start: 2024-04-11

## 2024-04-15 RX ORDER — VITAMIN B COMPLEX
1 CAPSULE ORAL DAILY
COMMUNITY

## 2024-04-15 SDOH — ECONOMIC STABILITY: FOOD INSECURITY: WITHIN THE PAST 12 MONTHS, YOU WORRIED THAT YOUR FOOD WOULD RUN OUT BEFORE YOU GOT MONEY TO BUY MORE.: NEVER TRUE

## 2024-04-15 SDOH — ECONOMIC STABILITY: HOUSING INSECURITY
IN THE LAST 12 MONTHS, WAS THERE A TIME WHEN YOU DID NOT HAVE A STEADY PLACE TO SLEEP OR SLEPT IN A SHELTER (INCLUDING NOW)?: NO

## 2024-04-15 SDOH — ECONOMIC STABILITY: INCOME INSECURITY: HOW HARD IS IT FOR YOU TO PAY FOR THE VERY BASICS LIKE FOOD, HOUSING, MEDICAL CARE, AND HEATING?: NOT HARD AT ALL

## 2024-04-15 SDOH — ECONOMIC STABILITY: FOOD INSECURITY: WITHIN THE PAST 12 MONTHS, THE FOOD YOU BOUGHT JUST DIDN'T LAST AND YOU DIDN'T HAVE MONEY TO GET MORE.: NEVER TRUE

## 2024-04-15 ASSESSMENT — PATIENT HEALTH QUESTIONNAIRE - PHQ9
SUM OF ALL RESPONSES TO PHQ QUESTIONS 1-9: 0
SUM OF ALL RESPONSES TO PHQ9 QUESTIONS 1 & 2: 0
SUM OF ALL RESPONSES TO PHQ QUESTIONS 1-9: 0
2. FEELING DOWN, DEPRESSED OR HOPELESS: NOT AT ALL
1. LITTLE INTEREST OR PLEASURE IN DOING THINGS: NOT AT ALL

## 2024-04-15 ASSESSMENT — ENCOUNTER SYMPTOMS
ABDOMINAL PAIN: 0
SHORTNESS OF BREATH: 0
CONSTIPATION: 0
VOMITING: 0
COUGH: 0
NAUSEA: 0
DIARRHEA: 0
WHEEZING: 0
BACK PAIN: 0

## 2024-04-15 NOTE — PROGRESS NOTES
Neck:      Thyroid: No thyromegaly.      Vascular: No carotid bruit.   Cardiovascular:      Rate and Rhythm: Normal rate and regular rhythm.      Heart sounds: Normal heart sounds. No murmur heard.  Pulmonary:      Effort: Pulmonary effort is normal. No respiratory distress.      Breath sounds: Normal breath sounds. No wheezing.   Musculoskeletal:         General: No tenderness or deformity. Normal range of motion.      Cervical back: Normal range of motion and neck supple.      Right lower leg: No edema.      Left lower leg: No edema.   Lymphadenopathy:      Cervical: No cervical adenopathy.   Skin:     General: Skin is warm and dry.      Findings: No rash.   Neurological:      General: No focal deficit present.      Mental Status: She is alert and oriented to person, place, and time.      Gait: Gait normal.   Psychiatric:         Mood and Affect: Mood and affect normal.         Speech: Speech normal.         Behavior: Behavior normal.         Thought Content: Thought content normal.         Labs:  CBC with Differential:    Lab Results   Component Value Date/Time    WBC 9.9 05/22/2020 12:00 AM    RBC 4.82 05/22/2020 12:00 AM    HGB 15 05/22/2020 12:00 AM    HCT 44.3 05/22/2020 12:00 AM     05/22/2020 12:00 AM    MCV 92.0 05/22/2020 12:00 AM    MCH 31.2 05/22/2020 12:00 AM    MCHC 33.9 05/22/2020 12:00 AM    LYMPHOPCT 8.2 05/22/2020 12:00 AM    MONOPCT 1.5 05/22/2020 12:00 AM    EOSPCT 0.1 05/22/2020 12:00 AM    BASOPCT 0.20 05/22/2020 12:00 AM    MONOSABS 0.2 05/22/2020 12:00 AM    LYMPHSABS 0.8 05/22/2020 12:00 AM    EOSABS 0.0 05/22/2020 12:00 AM    BASOSABS 0.0 05/22/2020 12:00 AM     CMP:    Lab Results   Component Value Date/Time     09/23/2023 12:00 AM    K 3.9 09/23/2023 12:00 AM     09/23/2023 12:00 AM    CO2 24 09/23/2023 12:00 AM    BUN 16 09/23/2023 12:00 AM    CREATININE 0.90 09/23/2023 12:00 AM    LABGLOM >60 02/14/2022 12:00 AM    GLUCOSE 103 09/23/2023 12:00 AM    LABALBU 3.9

## 2024-04-22 ENCOUNTER — TELEPHONE (OUTPATIENT)
Dept: PRIMARY CARE CLINIC | Age: 59
End: 2024-04-22

## 2024-04-22 NOTE — TELEPHONE ENCOUNTER
Spoke with patients insurance [facility] and the ct chest that is ordered does not need an authorization. Ref # [id number]  Patient notified of this by phone

## 2024-04-22 NOTE — TELEPHONE ENCOUNTER
----- Message from Roxana Mccabe MA sent at 4/22/2024 10:26 AM EDT -----  Subject: Message to Provider    QUESTIONS  Information for Provider? Pt wants to get her CT chest without contrast   done at [facility] P#[phone]. She needs a prior authorization   with her insurance. Please call her back when done so she can schedule.   ---------------------------------------------------------------------------  --------------  CALL BACK INFO  [phone]; OK to leave message on voicemail  ---------------------------------------------------------------------------  --------------  SCRIPT ANSWERS  Relationship to Patient? Self

## 2024-04-24 ENCOUNTER — TELEPHONE (OUTPATIENT)
Dept: PRIMARY CARE CLINIC | Age: 59
End: 2024-04-24

## 2024-04-24 NOTE — TELEPHONE ENCOUNTER
Please let patient know that I received her xray report and it did show moderate to severe OA of the CMC joint.   If she wants to see Ortho hand or Sports Medicine for injection, I'm happy to place a referral

## 2024-04-24 NOTE — TELEPHONE ENCOUNTER
Patient advised states taht she is using voltaren gel and will let us know if that doesn't help and if she would like a referral

## 2024-05-17 LAB — MAMMOGRAPHY, EXTERNAL: NORMAL

## 2024-05-21 ENCOUNTER — TELEPHONE (OUTPATIENT)
Dept: PRIMARY CARE CLINIC | Age: 59
End: 2024-05-21

## 2024-05-21 NOTE — TELEPHONE ENCOUNTER
Please let patient know that I reviewed her CT scan.  There is a new 2.5 mm nodule in the right posterior lung.  Recommendation from the radiologist is to repeat scan in 6 months.

## 2024-05-28 ENCOUNTER — TELEPHONE (OUTPATIENT)
Dept: PRIMARY CARE CLINIC | Age: 59
End: 2024-05-28

## 2024-05-28 NOTE — TELEPHONE ENCOUNTER
Patient seen in ED on Friday 5/24 at [facility] for pneumonia. Can you advise where to schedule?  Needing to go back to work on June 1st. Said she needs a return to work slip to go back that is dated that she can return on the 1st.     Employee [employer]   Fax. [phone]  Attention: Laura Severino

## 2024-05-29 ENCOUNTER — OFFICE VISIT (OUTPATIENT)
Dept: PRIMARY CARE CLINIC | Age: 59
End: 2024-05-29
Payer: COMMERCIAL

## 2024-05-29 VITALS
OXYGEN SATURATION: 97 % | DIASTOLIC BLOOD PRESSURE: 78 MMHG | WEIGHT: 153 LBS | SYSTOLIC BLOOD PRESSURE: 110 MMHG | BODY MASS INDEX: 28.89 KG/M2 | TEMPERATURE: 97.7 F | HEIGHT: 61 IN | HEART RATE: 63 BPM

## 2024-05-29 DIAGNOSIS — J18.9 PNEUMONIA OF LEFT LUNG DUE TO INFECTIOUS ORGANISM, UNSPECIFIED PART OF LUNG: ICD-10-CM

## 2024-05-29 DIAGNOSIS — N30.01 ACUTE CYSTITIS WITH HEMATURIA: ICD-10-CM

## 2024-05-29 DIAGNOSIS — J18.9 PNEUMONIA OF LEFT LUNG DUE TO INFECTIOUS ORGANISM, UNSPECIFIED PART OF LUNG: Primary | ICD-10-CM

## 2024-05-29 LAB
ALBUMIN: 4.1 G/DL (ref 3.5–5.2)
ALP BLD-CCNC: 116 U/L (ref 35–104)
ALT SERPL-CCNC: 56 U/L (ref 0–32)
ANION GAP SERPL CALCULATED.3IONS-SCNC: 16 MMOL/L (ref 7–16)
AST SERPL-CCNC: 29 U/L (ref 0–31)
BASOPHILS ABSOLUTE: 0.11 K/UL (ref 0–0.2)
BASOPHILS RELATIVE PERCENT: 1 % (ref 0–2)
BILIRUB SERPL-MCNC: 0.2 MG/DL (ref 0–1.2)
BILIRUBIN, URINE: NEGATIVE
BUN BLDV-MCNC: 18 MG/DL (ref 6–20)
CALCIUM SERPL-MCNC: 9.6 MG/DL (ref 8.6–10.2)
CHLORIDE BLD-SCNC: 101 MMOL/L (ref 98–107)
CO2: 22 MMOL/L (ref 22–29)
COLOR: YELLOW
COMMENT: ABNORMAL
CREAT SERPL-MCNC: 0.8 MG/DL (ref 0.5–1)
EOSINOPHILS ABSOLUTE: 0.17 K/UL (ref 0.05–0.5)
EOSINOPHILS RELATIVE PERCENT: 2 % (ref 0–6)
GFR, ESTIMATED: >90 ML/MIN/1.73M2
GLUCOSE BLD-MCNC: 78 MG/DL (ref 74–99)
GLUCOSE URINE: NEGATIVE MG/DL
HCT VFR BLD CALC: 39.2 % (ref 34–48)
HEMOGLOBIN: 12.9 G/DL (ref 11.5–15.5)
IMMATURE GRANULOCYTES %: 2 % (ref 0–5)
IMMATURE GRANULOCYTES ABSOLUTE: 0.16 K/UL (ref 0–0.58)
KETONES, URINE: NEGATIVE MG/DL
LEUKOCYTE ESTERASE, URINE: NEGATIVE
LYMPHOCYTES ABSOLUTE: 3.02 K/UL (ref 1.5–4)
LYMPHOCYTES RELATIVE PERCENT: 31 % (ref 20–42)
MCH RBC QN AUTO: 31.5 PG (ref 26–35)
MCHC RBC AUTO-ENTMCNC: 32.9 G/DL (ref 32–34.5)
MCV RBC AUTO: 95.8 FL (ref 80–99.9)
MONOCYTES ABSOLUTE: 0.66 K/UL (ref 0.1–0.95)
MONOCYTES RELATIVE PERCENT: 7 % (ref 2–12)
NEUTROPHILS ABSOLUTE: 5.66 K/UL (ref 1.8–7.3)
NEUTROPHILS RELATIVE PERCENT: 58 % (ref 43–80)
NITRITE, URINE: NEGATIVE
PDW BLD-RTO: 11.9 % (ref 11.5–15)
PH, URINE: 5.5 (ref 5–9)
PLATELET # BLD: 318 K/UL (ref 130–450)
PMV BLD AUTO: 10.6 FL (ref 7–12)
POTASSIUM SERPL-SCNC: 4.1 MMOL/L (ref 3.5–5)
PROTEIN UA: NEGATIVE MG/DL
RBC # BLD: 4.09 M/UL (ref 3.5–5.5)
SODIUM BLD-SCNC: 139 MMOL/L (ref 132–146)
SPECIFIC GRAVITY UA: >1.03 (ref 1–1.03)
TOTAL PROTEIN: 7.7 G/DL (ref 6.4–8.3)
TURBIDITY: CLEAR
URINE HGB: NEGATIVE
UROBILINOGEN, URINE: 0.2 EU/DL (ref 0–1)
WBC # BLD: 9.8 K/UL (ref 4.5–11.5)

## 2024-05-29 PROCEDURE — 99214 OFFICE O/P EST MOD 30 MIN: CPT | Performed by: FAMILY MEDICINE

## 2024-05-29 RX ORDER — ALBUTEROL SULFATE 90 UG/1
2 AEROSOL, METERED RESPIRATORY (INHALATION) 4 TIMES DAILY PRN
Qty: 18 G | Refills: 0 | Status: SHIPPED | OUTPATIENT
Start: 2024-05-29

## 2024-05-29 RX ORDER — PREDNISONE 10 MG/1
TABLET ORAL
Qty: 30 TABLET | Refills: 0 | Status: SHIPPED | OUTPATIENT
Start: 2024-05-29 | End: 2024-06-09

## 2024-05-29 RX ORDER — LEVOFLOXACIN 750 MG/1
750 TABLET, FILM COATED ORAL DAILY
COMMUNITY
Start: 2024-05-24

## 2024-05-29 ASSESSMENT — ENCOUNTER SYMPTOMS
WHEEZING: 0
SINUS PAIN: 0
EYE DISCHARGE: 0
ABDOMINAL PAIN: 0
COUGH: 1
SHORTNESS OF BREATH: 1
VOMITING: 0
BACK PAIN: 0
SORE THROAT: 1
RHINORRHEA: 0
DIARRHEA: 0
SINUS PRESSURE: 0
NAUSEA: 0
CONSTIPATION: 0

## 2024-05-29 NOTE — PROGRESS NOTES
24  Sil Forbes : 1965 Sex: female  Age: 58 y.o.    Chief Complaint   Patient presents with    ED Follow-up     Pneumonia - feels 50% better, still SOB, earache and sore throat      HPI:  58 y.o. female presents today for ER follow up.  Patient's chart, medical, surgical and medication history all reviewed.    ER follow up  Date seen in the ER: 24  Symptoms: URI symptoms, cough, SOB, wheezing, fatigue, back pain, fever, rigors  Labs/Imaging: CXR- ANN PNA; UA- UTI  Diagnosis: ANN PNA, UTI  Treatments: Levaquin    Patient still having issues with cough, SOB, fatigue.    ROS:  Review of Systems   Constitutional:  Negative for appetite change, chills, fatigue and fever.   HENT:  Positive for ear pain and sore throat. Negative for congestion, postnasal drip, rhinorrhea, sinus pressure and sinus pain.    Eyes:  Negative for discharge.   Respiratory:  Positive for cough and shortness of breath. Negative for wheezing.    Cardiovascular:  Negative for chest pain and palpitations.   Gastrointestinal:  Negative for abdominal pain, constipation, diarrhea, nausea and vomiting.   Musculoskeletal:  Positive for arthralgias, gait problem, myalgias and neck pain. Negative for back pain.   Skin:  Negative for rash.   Neurological:  Positive for numbness. Negative for dizziness, tremors, seizures, weakness and headaches.   Hematological:  Negative for adenopathy.   Psychiatric/Behavioral:  Negative for dysphoric mood. The patient is not nervous/anxious.    All other systems reviewed and are negative.     Current Outpatient Medications on File Prior to Visit   Medication Sig Dispense Refill    levoFLOXacin (LEVAQUIN) 750 MG tablet Take 1 tablet by mouth daily for 7 days      levothyroxine (SYNTHROID) 112 MCG tablet Take 1 tablet by mouth Daily      b complex vitamins capsule Take 1 capsule by mouth daily      atorvastatin (LIPITOR) 20 MG tablet Take 1 tablet by mouth daily 90 tablet 3    pantoprazole

## 2024-05-31 ENCOUNTER — TELEPHONE (OUTPATIENT)
Dept: PRIMARY CARE CLINIC | Age: 59
End: 2024-05-31

## 2024-05-31 LAB
CULTURE: NO GROWTH
SPECIMEN DESCRIPTION: NORMAL

## 2024-05-31 RX ORDER — AZITHROMYCIN 250 MG/1
TABLET, FILM COATED ORAL
Qty: 6 TABLET | Refills: 0 | Status: SHIPPED | OUTPATIENT
Start: 2024-05-31 | End: 2024-06-05

## 2024-05-31 RX ORDER — CEFDINIR 300 MG/1
300 CAPSULE ORAL 2 TIMES DAILY
Qty: 14 CAPSULE | Refills: 0 | Status: SHIPPED | OUTPATIENT
Start: 2024-05-31 | End: 2024-06-07

## 2024-05-31 NOTE — TELEPHONE ENCOUNTER
You said to call today if not better, she still c/o some sob with chest tightness/burning, afebrile. Still on Medrol Dose Magen but requesting another ATB

## 2024-08-27 ENCOUNTER — TELEPHONE (OUTPATIENT)
Dept: PRIMARY CARE CLINIC | Age: 59
End: 2024-08-27

## 2024-08-27 RX ORDER — METHYLPREDNISOLONE 4 MG
TABLET, DOSE PACK ORAL
Qty: 21 TABLET | Refills: 0 | Status: SHIPPED | OUTPATIENT
Start: 2024-08-27 | End: 2024-09-02

## 2024-08-27 NOTE — TELEPHONE ENCOUNTER
The pt is calling because she is having a flare up in her spine that is causing numbness and tingling in her arms she is asking if she can get a script for steroid pack sent over to the pharmacy

## 2024-09-24 ENCOUNTER — OFFICE VISIT (OUTPATIENT)
Dept: FAMILY MEDICINE CLINIC | Age: 59
End: 2024-09-24
Payer: COMMERCIAL

## 2024-09-24 VITALS
RESPIRATION RATE: 18 BRPM | BODY MASS INDEX: 29.45 KG/M2 | TEMPERATURE: 97.6 F | HEIGHT: 61 IN | HEART RATE: 74 BPM | OXYGEN SATURATION: 97 % | DIASTOLIC BLOOD PRESSURE: 80 MMHG | SYSTOLIC BLOOD PRESSURE: 132 MMHG | WEIGHT: 156 LBS

## 2024-09-24 DIAGNOSIS — J06.9 UPPER RESPIRATORY TRACT INFECTION, UNSPECIFIED TYPE: Primary | ICD-10-CM

## 2024-09-24 PROCEDURE — 99203 OFFICE O/P NEW LOW 30 MIN: CPT | Performed by: PHYSICIAN ASSISTANT

## 2024-09-24 RX ORDER — DOXYCYCLINE HYCLATE 100 MG
100 TABLET ORAL 2 TIMES DAILY
Qty: 20 TABLET | Refills: 0 | Status: SHIPPED | OUTPATIENT
Start: 2024-09-24 | End: 2024-10-04

## 2024-09-24 RX ORDER — METHYLPREDNISOLONE 4 MG
TABLET, DOSE PACK ORAL
Qty: 1 KIT | Refills: 0 | Status: SHIPPED | OUTPATIENT
Start: 2024-09-24

## 2024-09-24 RX ORDER — BENZONATATE 100 MG/1
100-200 CAPSULE ORAL 3 TIMES DAILY PRN
Qty: 42 CAPSULE | Refills: 0 | Status: SHIPPED | OUTPATIENT
Start: 2024-09-24 | End: 2024-10-01

## 2024-09-24 ASSESSMENT — ENCOUNTER SYMPTOMS
NAUSEA: 0
PHOTOPHOBIA: 0
COUGH: 1
DIARRHEA: 0
WHEEZING: 1
ABDOMINAL PAIN: 0
VOMITING: 0
BACK PAIN: 0
SORE THROAT: 0
SHORTNESS OF BREATH: 0

## 2024-11-15 DIAGNOSIS — K21.9 GASTROESOPHAGEAL REFLUX DISEASE WITHOUT ESOPHAGITIS: ICD-10-CM

## 2024-11-15 RX ORDER — PANTOPRAZOLE SODIUM 40 MG/1
40 TABLET, DELAYED RELEASE ORAL DAILY
Qty: 90 TABLET | Refills: 3 | Status: SHIPPED | OUTPATIENT
Start: 2024-11-15

## 2024-12-16 DIAGNOSIS — K21.9 GASTROESOPHAGEAL REFLUX DISEASE WITHOUT ESOPHAGITIS: ICD-10-CM

## 2024-12-16 RX ORDER — PANTOPRAZOLE SODIUM 40 MG/1
40 TABLET, DELAYED RELEASE ORAL DAILY
Qty: 90 TABLET | Refills: 3 | Status: SHIPPED | OUTPATIENT
Start: 2024-12-16

## 2024-12-16 NOTE — TELEPHONE ENCOUNTER
Name of Medication(s) Requested:  Requested Prescriptions     Pending Prescriptions Disp Refills    pantoprazole (PROTONIX) 40 MG tablet 90 tablet 3     Sig: Take 1 tablet by mouth daily       Medication is on current medication list Yes    Dosage and directions were verified? Yes    Quantity verified: 90 day supply     Pharmacy Verified?  Yes    Last Appointment:  5/29/2024    Future appts:  No future appointments.     (If no appt send self scheduling link. .REFILLAPPT)  Scheduling request sent?     [] Yes  [] No    Does patient need updated?  [] Yes  [] No

## 2025-04-24 ENCOUNTER — OFFICE VISIT (OUTPATIENT)
Dept: FAMILY MEDICINE CLINIC | Age: 60
End: 2025-04-24
Payer: COMMERCIAL

## 2025-04-24 VITALS
HEART RATE: 76 BPM | TEMPERATURE: 97.7 F | HEIGHT: 61 IN | RESPIRATION RATE: 18 BRPM | OXYGEN SATURATION: 97 % | WEIGHT: 149 LBS | SYSTOLIC BLOOD PRESSURE: 120 MMHG | BODY MASS INDEX: 28.13 KG/M2 | DIASTOLIC BLOOD PRESSURE: 68 MMHG

## 2025-04-24 DIAGNOSIS — J02.9 SORE THROAT: ICD-10-CM

## 2025-04-24 DIAGNOSIS — R09.81 SINUS CONGESTION: ICD-10-CM

## 2025-04-24 DIAGNOSIS — J01.90 ACUTE NON-RECURRENT SINUSITIS, UNSPECIFIED LOCATION: Primary | ICD-10-CM

## 2025-04-24 LAB
Lab: NORMAL
PERFORMING INSTRUMENT: NORMAL
QC PASS/FAIL: NORMAL
S PYO AG THROAT QL: NORMAL
SARS-COV-2, POC: NORMAL

## 2025-04-24 PROCEDURE — 87426 SARSCOV CORONAVIRUS AG IA: CPT | Performed by: NURSE PRACTITIONER

## 2025-04-24 PROCEDURE — 99213 OFFICE O/P EST LOW 20 MIN: CPT | Performed by: NURSE PRACTITIONER

## 2025-04-24 PROCEDURE — 87880 STREP A ASSAY W/OPTIC: CPT | Performed by: NURSE PRACTITIONER

## 2025-04-24 NOTE — PROGRESS NOTES
25  Sil Forbes : 1965 Sex: female  Age 59 y.o.    Subjective:  Chief Complaint   Patient presents with    Congestion     X 5 days       HPI:   Sil Forbes , 59 y.o. female presents to the clinic for evaluation of sinus congestion x 5 days. The patient also reports headache, mild cough. The patient has taken antihistamine for symptoms. The patient reports worsening symptoms over time. The patient reports possible ill exposure. Denies rash, and fever. Denies chest pain, abdominal pain, shortness of breath, wheezing, and nausea / vomiting / diarrhea.    ROS:   Unless otherwise stated in this report the patient's positive and negative responses for review of systems for constitutional, eyes, ENT, cardiovascular, respiratory, gastrointestinal, neurological, , musculoskeletal, and integument systems and related systems to the presenting problem are either stated in the history of present illness or were not pertinent or were negative for the symptoms and/or complaints related to the presenting medical problem.  Positives and pertinent negatives as per HPI.  All others reviewed and are negative.      PMH:     Past Medical History:   Diagnosis Date    Basal cell carcinoma of skin 2019    s/p resection    CREST syndrome (HCC) 2020    Gastroesophageal reflux disease without esophagitis 2020    Hypothyroidism 2020    LTBI (latent tuberculosis infection) 2020    Malignant neoplasm of ethmoidal sinus (HCC) 2020    Peripheral T-cell lymphoma (HCC) 2020    Syringomyelia (HCC) 2020    Transverse myelitis (HCC) 2020       Past Surgical History:   Procedure Laterality Date    CYSTOSCOPY  2016    Dr. Traore    HYSTERECTOMY, TOTAL ABDOMINAL (CERVIX REMOVED)      NOSE SURGERY      for squamous cell cancer       Family History   Problem Relation Age of Onset    Lung Cancer Mother     Hypertension Mother     COPD Father        Medications:     Current

## 2025-05-08 ENCOUNTER — OFFICE VISIT (OUTPATIENT)
Dept: PRIMARY CARE CLINIC | Age: 60
End: 2025-05-08
Payer: COMMERCIAL

## 2025-05-08 VITALS
BODY MASS INDEX: 27 KG/M2 | HEART RATE: 59 BPM | HEIGHT: 61 IN | TEMPERATURE: 97.5 F | SYSTOLIC BLOOD PRESSURE: 122 MMHG | OXYGEN SATURATION: 97 % | WEIGHT: 143 LBS | DIASTOLIC BLOOD PRESSURE: 80 MMHG

## 2025-05-08 DIAGNOSIS — Z12.31 ENCOUNTER FOR SCREENING MAMMOGRAM FOR MALIGNANT NEOPLASM OF BREAST: ICD-10-CM

## 2025-05-08 DIAGNOSIS — E78.2 MIXED HYPERLIPIDEMIA: ICD-10-CM

## 2025-05-08 DIAGNOSIS — R91.8 MULTIPLE PULMONARY NODULES: ICD-10-CM

## 2025-05-08 DIAGNOSIS — G83.4 CAUDA EQUINA SYNDROME (HCC): ICD-10-CM

## 2025-05-08 DIAGNOSIS — G37.9 DEMYELINATING DISEASE OF CENTRAL NERVOUS SYSTEM (HCC): ICD-10-CM

## 2025-05-08 DIAGNOSIS — E06.3 HYPOTHYROIDISM DUE TO HASHIMOTO THYROIDITIS: ICD-10-CM

## 2025-05-08 DIAGNOSIS — Z00.01 ENCOUNTER FOR WELL ADULT EXAM WITH ABNORMAL FINDINGS: Primary | ICD-10-CM

## 2025-05-08 DIAGNOSIS — L23.1 ALLERGIC CONTACT DERMATITIS DUE TO ADHESIVES: ICD-10-CM

## 2025-05-08 DIAGNOSIS — K21.9 GASTROESOPHAGEAL REFLUX DISEASE WITHOUT ESOPHAGITIS: ICD-10-CM

## 2025-05-08 PROBLEM — G52.7: Status: RESOLVED | Noted: 2021-04-09 | Resolved: 2025-05-08

## 2025-05-08 PROBLEM — G95.0 SYRINGOMYELIA (HCC): Status: RESOLVED | Noted: 2020-04-28 | Resolved: 2025-05-08

## 2025-05-08 PROBLEM — G60.9 IDIOPATHIC PERIPHERAL NEUROPATHY: Status: RESOLVED | Noted: 2020-06-25 | Resolved: 2025-05-08

## 2025-05-08 PROCEDURE — 99396 PREV VISIT EST AGE 40-64: CPT | Performed by: FAMILY MEDICINE

## 2025-05-08 RX ORDER — FAMOTIDINE 40 MG/1
40 TABLET, FILM COATED ORAL DAILY
Qty: 90 TABLET | Refills: 3 | Status: SHIPPED | OUTPATIENT
Start: 2025-05-08

## 2025-05-08 RX ORDER — PREDNISONE 10 MG/1
TABLET ORAL
Qty: 30 TABLET | Refills: 0 | Status: SHIPPED | OUTPATIENT
Start: 2025-05-08 | End: 2025-05-20

## 2025-05-08 RX ORDER — LEVOTHYROXINE SODIUM 150 UG/1
150 TABLET ORAL DAILY
COMMUNITY
Start: 2025-05-02

## 2025-05-08 RX ORDER — ATORVASTATIN CALCIUM 20 MG/1
20 TABLET, FILM COATED ORAL DAILY
Qty: 90 TABLET | Refills: 3 | Status: SHIPPED | OUTPATIENT
Start: 2025-05-08

## 2025-05-08 RX ORDER — M-VIT,TX,IRON,MINS/CALC/FOLIC 27MG-0.4MG
1 TABLET ORAL DAILY
COMMUNITY

## 2025-05-08 SDOH — ECONOMIC STABILITY: FOOD INSECURITY: WITHIN THE PAST 12 MONTHS, YOU WORRIED THAT YOUR FOOD WOULD RUN OUT BEFORE YOU GOT MONEY TO BUY MORE.: NEVER TRUE

## 2025-05-08 SDOH — ECONOMIC STABILITY: FOOD INSECURITY: WITHIN THE PAST 12 MONTHS, THE FOOD YOU BOUGHT JUST DIDN'T LAST AND YOU DIDN'T HAVE MONEY TO GET MORE.: NEVER TRUE

## 2025-05-08 ASSESSMENT — PATIENT HEALTH QUESTIONNAIRE - PHQ9
2. FEELING DOWN, DEPRESSED OR HOPELESS: NOT AT ALL
SUM OF ALL RESPONSES TO PHQ QUESTIONS 1-9: 0
SUM OF ALL RESPONSES TO PHQ QUESTIONS 1-9: 0
1. LITTLE INTEREST OR PLEASURE IN DOING THINGS: NOT AT ALL
SUM OF ALL RESPONSES TO PHQ QUESTIONS 1-9: 0
SUM OF ALL RESPONSES TO PHQ QUESTIONS 1-9: 0

## 2025-05-08 ASSESSMENT — ENCOUNTER SYMPTOMS
VOMITING: 0
ABDOMINAL PAIN: 0
BACK PAIN: 0
SHORTNESS OF BREATH: 0
COUGH: 0
DIARRHEA: 0
WHEEZING: 0
NAUSEA: 0
CONSTIPATION: 0

## 2025-05-08 NOTE — ASSESSMENT & PLAN NOTE
Orders:    atorvastatin (LIPITOR) 20 MG tablet; Take 1 tablet by mouth daily    Lipid Panel; Future

## 2025-05-08 NOTE — PROGRESS NOTES
25  Sil Forbes : 1965 Sex: female  Age: 59 y.o.    Chief Complaint   Patient presents with    Annual Exam     HPI:  59 y.o. female presents today for yearly exam.  Patient's chart, medical, surgical and medication history all reviewed.    Well Adult Physical  Patient here for a physical exam.  The patient reports problems - patient recently told that she has Hashimoto's rather than acquired hypothyroidism.  Due for lung cancer screening.  Has a rash on her R forearm.     Do you take any herbs or supplements that were not prescribed by a doctor? yes   Are you taking calcium supplements? no   Are you taking aspirin daily? yes    Colonoscopy: Date of last Colonoscopy: 2023   No cologuard on file  No FIT/FOBT on file   No flexible sigmoidoscopy on file  Dental visit: Within last 6 mos  Vision check:  No Problems  PAP:  S/p hysterectomy  Mammo: Date of last Mammogram: 2024     Last time routine bloodwork was done:  2025    Immunization status: missing doses of Shingles    Smoking status: recently quit smoking    Physical activity:  intermittently    ROS:  Review of Systems   Constitutional:  Negative for chills, fatigue and fever.   Respiratory:  Negative for cough, shortness of breath and wheezing.    Cardiovascular:  Negative for chest pain and palpitations.   Gastrointestinal:  Negative for abdominal pain, constipation, diarrhea, nausea and vomiting.   Musculoskeletal:  Positive for arthralgias, gait problem, myalgias and neck pain. Negative for back pain.   Skin:  Positive for rash.   Neurological:  Positive for numbness. Negative for dizziness, tremors, seizures, weakness and headaches.   Psychiatric/Behavioral:  Negative for dysphoric mood. The patient is not nervous/anxious.    All other systems reviewed and are negative.     Current Outpatient Medications on File Prior to Visit   Medication Sig Dispense Refill    levothyroxine (SYNTHROID) 150 MCG tablet Take 1 tablet by mouth

## 2025-05-08 NOTE — ASSESSMENT & PLAN NOTE
Monitored by specialist- no acute findings meriting change in the plan    Orders:    US THYROID; Future

## 2025-05-20 ENCOUNTER — RESULTS FOLLOW-UP (OUTPATIENT)
Dept: PRIMARY CARE CLINIC | Age: 60
End: 2025-05-20